# Patient Record
Sex: MALE | Race: ASIAN | NOT HISPANIC OR LATINO | Employment: FULL TIME | URBAN - METROPOLITAN AREA
[De-identification: names, ages, dates, MRNs, and addresses within clinical notes are randomized per-mention and may not be internally consistent; named-entity substitution may affect disease eponyms.]

---

## 2019-02-07 ENCOUNTER — OFFICE VISIT (OUTPATIENT)
Dept: PEDIATRICS | Facility: CLINIC | Age: 59
End: 2019-02-07
Payer: COMMERCIAL

## 2019-02-07 VITALS
WEIGHT: 159 LBS | TEMPERATURE: 98 F | HEART RATE: 74 BPM | SYSTOLIC BLOOD PRESSURE: 124 MMHG | OXYGEN SATURATION: 98 % | BODY MASS INDEX: 25.55 KG/M2 | DIASTOLIC BLOOD PRESSURE: 80 MMHG | HEIGHT: 66 IN

## 2019-02-07 DIAGNOSIS — Z11.3 ROUTINE SCREENING FOR STI (SEXUALLY TRANSMITTED INFECTION): ICD-10-CM

## 2019-02-07 DIAGNOSIS — Z00.00 ANNUAL PHYSICAL EXAM: Primary | ICD-10-CM

## 2019-02-07 DIAGNOSIS — E78.5 HYPERLIPIDEMIA LDL GOAL <100: ICD-10-CM

## 2019-02-07 DIAGNOSIS — M77.8 TENDONITIS OF ELBOW, RIGHT: ICD-10-CM

## 2019-02-07 LAB
ALBUMIN SERPL-MCNC: 4.5 G/DL (ref 3.4–5)
ALP SERPL-CCNC: 118 U/L (ref 40–150)
ALT SERPL W P-5'-P-CCNC: 37 U/L (ref 0–70)
ANION GAP SERPL CALCULATED.3IONS-SCNC: 7 MMOL/L (ref 3–14)
AST SERPL W P-5'-P-CCNC: 29 U/L (ref 0–45)
BILIRUB SERPL-MCNC: 0.8 MG/DL (ref 0.2–1.3)
BUN SERPL-MCNC: 10 MG/DL (ref 7–30)
CALCIUM SERPL-MCNC: 9.1 MG/DL (ref 8.5–10.1)
CHLORIDE SERPL-SCNC: 102 MMOL/L (ref 94–109)
CHOLEST SERPL-MCNC: 149 MG/DL
CO2 SERPL-SCNC: 26 MMOL/L (ref 20–32)
CREAT SERPL-MCNC: 0.76 MG/DL (ref 0.66–1.25)
GFR SERPL CREATININE-BSD FRML MDRD: >90 ML/MIN/{1.73_M2}
GLUCOSE SERPL-MCNC: 124 MG/DL (ref 70–99)
HDLC SERPL-MCNC: 67 MG/DL
LDLC SERPL CALC-MCNC: 70 MG/DL
NONHDLC SERPL-MCNC: 82 MG/DL
POTASSIUM SERPL-SCNC: 4.2 MMOL/L (ref 3.4–5.3)
PROT SERPL-MCNC: 8.1 G/DL (ref 6.8–8.8)
SODIUM SERPL-SCNC: 135 MMOL/L (ref 133–144)
TRIGL SERPL-MCNC: 61 MG/DL

## 2019-02-07 PROCEDURE — 87389 HIV-1 AG W/HIV-1&-2 AB AG IA: CPT | Performed by: PEDIATRICS

## 2019-02-07 PROCEDURE — 99213 OFFICE O/P EST LOW 20 MIN: CPT | Mod: 25 | Performed by: STUDENT IN AN ORGANIZED HEALTH CARE EDUCATION/TRAINING PROGRAM

## 2019-02-07 PROCEDURE — 87491 CHLMYD TRACH DNA AMP PROBE: CPT | Performed by: PEDIATRICS

## 2019-02-07 PROCEDURE — 0064U ANTB TP TOTAL&RPR IA QUAL: CPT | Performed by: PEDIATRICS

## 2019-02-07 PROCEDURE — 80061 LIPID PANEL: CPT | Performed by: PEDIATRICS

## 2019-02-07 PROCEDURE — 36415 COLL VENOUS BLD VENIPUNCTURE: CPT | Performed by: PEDIATRICS

## 2019-02-07 PROCEDURE — 99386 PREV VISIT NEW AGE 40-64: CPT | Mod: GC | Performed by: STUDENT IN AN ORGANIZED HEALTH CARE EDUCATION/TRAINING PROGRAM

## 2019-02-07 PROCEDURE — 87591 N.GONORRHOEAE DNA AMP PROB: CPT | Performed by: PEDIATRICS

## 2019-02-07 PROCEDURE — 80053 COMPREHEN METABOLIC PANEL: CPT | Performed by: PEDIATRICS

## 2019-02-07 RX ORDER — SIMVASTATIN 40 MG
40 TABLET ORAL
COMMUNITY
Start: 2018-08-20 | End: 2019-02-07

## 2019-02-07 RX ORDER — SIMVASTATIN 40 MG
40 TABLET ORAL AT BEDTIME
Qty: 30 TABLET | Refills: 11 | Status: SHIPPED | OUTPATIENT
Start: 2019-02-07 | End: 2020-02-28

## 2019-02-07 ASSESSMENT — MIFFLIN-ST. JEOR: SCORE: 1493.97

## 2019-02-07 NOTE — PATIENT INSTRUCTIONS
- will do labs today; call or mail you with the results  - please try ibuprofen 400 mg every 6 hours for next couple days; then transition to as needed  - get a elbow support brace from any pharmacy and use it for next 3-4 weeks; if not improving, please call clinic back for re-evaluation      Preventive Health Recommendations  Male Ages 50 - 64    Yearly exam:             See your health care provider every year in order to  o   Review health changes.   o   Discuss preventive care.    o   Review your medicines if your doctor has prescribed any.     Have a cholesterol test every 5 years, or more frequently if you are at risk for high cholesterol/heart disease.     Have a diabetes test (fasting glucose) every three years. If you are at risk for diabetes, you should have this test more often.     Have a colonoscopy at age 50, or have a yearly FIT test (stool test). These exams will check for colon cancer.      Talk with your health care provider about whether or not a prostate cancer screening test (PSA) is right for you.    You should be tested each year for STDs (sexually transmitted diseases), if you re at risk.     Shots: Get a flu shot each year. Get a tetanus shot every 10 years.     Nutrition:    Eat at least 5 servings of fruits and vegetables daily.     Eat whole-grain bread, whole-wheat pasta and brown rice instead of white grains and rice.     Get adequate Calcium and Vitamin D.     Lifestyle    Exercise for at least 150 minutes a week (30 minutes a day, 5 days a week). This will help you control your weight and prevent disease.     Limit alcohol to one drink per day.     No smoking.     Wear sunscreen to prevent skin cancer.     See your dentist every six months for an exam and cleaning.     See your eye doctor every 1 to 2 years.

## 2019-02-07 NOTE — PROGRESS NOTES
SUBJECTIVE:   CC: Walt Yin is an 58 year old male who presents for preventive health visit.     Healthy Habits:    Do you get at least three servings of calcium containing foods daily (dairy, green leafy vegetables, etc.)? yes    Amount of exercise or daily activities, outside of work: 30 minutes per day    Problems taking medications regularly No    Medication side effects: No    Have you had an eye exam in the past two years? yes    Do you see a dentist twice per year? yes    Do you have sleep apnea, excessive snoring or daytime drowsiness?yes  Colonoscopy done on this date: 2015 (approximately), results were hemorrhoids and recommended repeat in 2025.     Mr. Yin is a 57 yo M w/ hx of hyperlipidemia and prediabetes who presents to clinic for annual physical. Only concern today is that he has had right elbow pain for 1 year. He describes it as shock-type pain that radiates down the arm. Not associated with numbness or weakness. Still able to do all his regular activities without limitation. Has not noticed any swelling around the elbow and no injury to the area. He denies any repetitive movements of right elbow and is also left-handed. Not taking any medications for his pain. Otherwise healthy and had a colonoscopy done in 2015 with recommendation to repeat in 2025. He did not get a flu shot this year and does not want one. UTD on tetanus booster.      Today's PHQ-2 Score:   PHQ-2 ( 1999 Pfizer) 2/7/2019   Q1: Little interest or pleasure in doing things 0   Q2: Feeling down, depressed or hopeless 0   PHQ-2 Score 0       Abuse: Current or Past(Physical, Sexual or Emotional)- No  Do you feel safe in your environment? Yes    Social History     Tobacco Use     Smoking status: Never Smoker     Smokeless tobacco: Current User     Types: Chew   Substance Use Topics     Alcohol use: Yes     Comment: Social     If you drink alcohol do you typically have >3 drinks per day or >7 drinks per week? No                   "    Last PSA: No results found for: PSA    Reviewed orders with patient. Reviewed health maintenance and updated orders accordingly - Yes  There is no problem list on file for this patient.    History reviewed. No pertinent surgical history.    Social History     Tobacco Use     Smoking status: Never Smoker     Smokeless tobacco: Current User     Types: Chew   Substance Use Topics     Alcohol use: Yes     Comment: Social     History reviewed. No pertinent family history.      Current Outpatient Medications   Medication Sig Dispense Refill     aspirin (ASA) 81 MG tablet Take 81 mg by mouth       simvastatin (ZOCOR) 40 MG tablet Take 1 tablet (40 mg) by mouth At Bedtime 30 tablet 11     Not on File    Reviewed and updated as needed this visit by clinical staff  Tobacco  Allergies  Meds  Problems  Med Hx  Surg Hx  Fam Hx  Soc Hx          Reviewed and updated as needed this visit by Provider  Tobacco  Allergies  Meds  Problems  Med Hx  Surg Hx  Fam Hx            ROS:  CONSTITUTIONAL: NEGATIVE for fever, chills, change in weight  INTEGUMENTARY/SKIN: NEGATIVE for worrisome rashes, moles or lesions  EYES: NEGATIVE for vision changes or irritation  ENT: NEGATIVE for ear, mouth and throat problems  RESP: NEGATIVE for significant cough or SOB  CV: NEGATIVE for chest pain, palpitations or peripheral edema  GI: NEGATIVE for nausea, abdominal pain, heartburn, or change in bowel habits   male: POSITIVE for hernia  MUSCULOSKELETAL: POSITIVE for right elbow pain  NEURO: NEGATIVE for weakness, dizziness or paresthesias  PSYCHIATRIC: NEGATIVE for changes in mood or affect    OBJECTIVE:   /80 (BP Location: Right arm, Cuff Size: Adult Regular)   Pulse 74   Temp 98  F (36.7  C) (Oral)   Ht 1.676 m (5' 6\")   Wt 72.1 kg (159 lb)   SpO2 98%   BMI 25.66 kg/m       EXAM:  GENERAL: healthy, alert and no distress  EYES: Eyes grossly normal to inspection, PERRL and conjunctivae and sclerae normal, EOMI  HENT: ear " canals and TM's normal, nose and mouth without ulcers or lesions, MMM, multiple dental caries but no abscess or drainage noted  NECK: no adenopathy, no asymmetry, masses, or scars and thyroid normal to palpation  RESP: lungs clear to auscultation - no rales, rhonchi or wheezes  CV: regular rate and rhythm, normal S1 S2, no S3 or S4, no murmur, click or rub, no peripheral edema and peripheral pulses strong  ABDOMEN: soft, nontender, no hepatosplenomegaly, no masses and bowel sounds normal  : Right side direct inguinal hernia noted, no strangulation, reducible  MS: no gross musculoskeletal defects noted, no edema  SKIN: no suspicious lesions or rashes  NEURO: Alert, CN II-XII intact, normal tone and 5/5 strength in all extremities, 2+ patellar reflex bilaterally, mentation intact and speech normal  PSYCH: mentation appears normal, affect normal/bright    Diagnostic Test Results:  - CMP: pending  - lipid panel: pending  - GC/chlamydia: pending  - HIV: pending  - syphilis: pending    ASSESSMENT/PLAN:   1. Annual physical exam  Hx of hyperlipidemia on simvastatin and doing well. Last LDL was 88. His glucose has been intermittently in high range as well as HgbA1C but no formal diagnosis of diabetes in the past. Will check these with LFTs today  - Lipid panel reflex to direct LDL Fasting  - Comprehensive metabolic panel (BMP + Alb, Alk Phos, ALT, AST, Total. Bili, TP)    2. Routine screening for STI (sexually transmitted infection)  Sexually active with his wife. Has never been tested  - NEISSERIA GONORRHOEA PCR  - CHLAMYDIA TRACHOMATIS PCR  - HIV Antigen Antibody Combo  - Treponema Abs w Reflex to RPR and Titer    3. Tendonitis of elbow, right  Likely tendonitis of elbow. No trauma or injury suggestive of acute fracture. Epicondylitis less likely with no repetitive movement especially patient being left-handed. Symptoms are not consistent with carpal tunnel syndrome. Will try conservative management before doing further  "work-up  - ibuprofen 400 mg q6h for next couple days; transition to as needed afterwards  - obtain OTC elbow brace for support and use it for next 3-4 weeks    4. Hyperlipidemia LDL goal <100  Will refill statin today  - simvastatin (ZOCOR) 40 MG tablet; Take 1 tablet (40 mg) by mouth At Bedtime  Dispense: 30 tablet; Refill: 11    COUNSELING:  Reviewed preventive health counseling, as reflected in patient instructions       Regular exercise       Healthy diet/nutrition       Vision screening       Hearing screening       Immunizations    UTD       Aspirin Prophylaxsis       Safe sex practices/STD prevention       HIV screeninx in teen years, 1x in adult years, and at intervals if high risk       Colon cancer screening       Prostate cancer screening    BP Readings from Last 1 Encounters:   19 124/80     Estimated body mass index is 25.66 kg/m  as calculated from the following:    Height as of this encounter: 1.676 m (5' 6\").    Weight as of this encounter: 72.1 kg (159 lb).    BP Screening:   Last 3 BP Readings:    BP Readings from Last 3 Encounters:   19 124/80       The following was recommended to the patient:  Re-screen BP within a year and recommended lifestyle modifications       reports that  has never smoked. His smokeless tobacco use includes chew.  Tobacco Cessation Action Plan: Information offered: Patient not interested at this time    Counseling Resources:  ATP IV Guidelines  Pooled Cohorts Equation Calculator  FRAX Risk Assessment  ICSI Preventive Guidelines  Dietary Guidelines for Americans,   [employer] MyPlate  ASA Prophylaxis  Lung CA Screening    Patient seen and discussed with Dr. Jennifer Contreras MD  [facility]    Attestation:    I have seen patient and reviewed the documentation from Dr. Contreras and discussed the findings with Dr. Contreras. I agree with the documentation of Dr. Contreras.    Afsaneh Rodriguez MD  Internal Medicine/Pediatrics  [facility]  "

## 2019-02-07 NOTE — LETTER
[facility] ([facility]  [address]  [place] [address]                  [phone]   February 8, 2019    Walt Yin  [address]  [address]  [place] [address]      Dear Walt,    Here is a summary of your recent test results:    Your STD testing is negative.     Your cholesterol is well controlled.     Your metabolic panel (electrolytes, liver and kidney function) is normal, except it does show pre-diabetes. We will continue to screen for diabetes yearly.     Your test results are enclosed.      Please contact me if you have any questions.           Thank you very much for choosing [facility]    Best regards,    Afsaneh Rordiguez MD        Results for orders placed or performed in visit on 02/07/19   Lipid panel reflex to direct LDL Fasting   Result Value Ref Range    Cholesterol 149 <200 mg/dL    Triglycerides 61 <150 mg/dL    HDL Cholesterol 67 >39 mg/dL    LDL Cholesterol Calculated 70 <100 mg/dL    Non HDL Cholesterol 82 <130 mg/dL   HIV Antigen Antibody Combo   Result Value Ref Range    HIV Antigen Antibody Combo Nonreactive NR^Nonreactive       Treponema Abs w Reflex to RPR and Titer   Result Value Ref Range    Treponema Antibodies Nonreactive NR^Nonreactive   Comprehensive metabolic panel (BMP + Alb, Alk Phos, ALT, AST, Total. Bili, TP)   Result Value Ref Range    Sodium 135 133 - 144 mmol/L    Potassium 4.2 3.4 - 5.3 mmol/L    Chloride 102 94 - 109 mmol/L    Carbon Dioxide 26 20 - 32 mmol/L    Anion Gap 7 3 - 14 mmol/L    Glucose 124 (H) 70 - 99 mg/dL    Urea Nitrogen 10 7 - 30 mg/dL    Creatinine 0.76 0.66 - 1.25 mg/dL    GFR Estimate >90 >60 mL/min/[1.73_m2]    GFR Estimate If Black >90 >60 mL/min/[1.73_m2]    Calcium 9.1 8.5 - 10.1 mg/dL    Bilirubin Total 0.8 0.2 - 1.3 mg/dL    Albumin 4.5 3.4 - 5.0 g/dL    Protein Total 8.1 6.8 - 8.8 g/dL    Alkaline Phosphatase 118 40 - 150 U/L    ALT 37 0 - 70 U/L    AST 29 0 - 45 U/L   NEISSERIA GONORRHOEA PCR    Result Value Ref Range    Specimen Descrip Urine     N Gonorrhea PCR Negative NEG^Negative   CHLAMYDIA TRACHOMATIS PCR   Result Value Ref Range    Specimen Description Urine     Chlamydia Trachomatis PCR Negative NEG^Negative

## 2019-02-08 PROBLEM — R73.03 PREDIABETES: Status: ACTIVE | Noted: 2019-02-08

## 2019-02-08 PROBLEM — E78.5 HYPERLIPIDEMIA LDL GOAL <160: Status: ACTIVE | Noted: 2019-02-08

## 2019-02-08 LAB
C TRACH DNA SPEC QL NAA+PROBE: NEGATIVE
HIV 1+2 AB+HIV1 P24 AG SERPL QL IA: NONREACTIVE
N GONORRHOEA DNA SPEC QL NAA+PROBE: NEGATIVE
SPECIMEN SOURCE: NORMAL
SPECIMEN SOURCE: NORMAL
T PALLIDUM AB SER QL: NONREACTIVE

## 2020-03-12 ENCOUNTER — OFFICE VISIT (OUTPATIENT)
Dept: PEDIATRICS | Facility: CLINIC | Age: 60
End: 2020-03-12
Payer: COMMERCIAL

## 2020-03-12 VITALS
TEMPERATURE: 98.6 F | RESPIRATION RATE: 20 BRPM | SYSTOLIC BLOOD PRESSURE: 126 MMHG | DIASTOLIC BLOOD PRESSURE: 70 MMHG | OXYGEN SATURATION: 99 % | HEART RATE: 54 BPM | BODY MASS INDEX: 26.15 KG/M2 | HEIGHT: 64 IN | WEIGHT: 153.2 LBS

## 2020-03-12 DIAGNOSIS — J30.2 SEASONAL ALLERGIC RHINITIS, UNSPECIFIED TRIGGER: ICD-10-CM

## 2020-03-12 DIAGNOSIS — R06.83 SNORING: ICD-10-CM

## 2020-03-12 DIAGNOSIS — E78.5 HYPERLIPIDEMIA LDL GOAL <100: ICD-10-CM

## 2020-03-12 DIAGNOSIS — Z12.5 SCREENING FOR PROSTATE CANCER: ICD-10-CM

## 2020-03-12 DIAGNOSIS — Z00.00 ROUTINE GENERAL MEDICAL EXAMINATION AT A HEALTH CARE FACILITY: Primary | ICD-10-CM

## 2020-03-12 PROCEDURE — 99396 PREV VISIT EST AGE 40-64: CPT | Mod: GC | Performed by: STUDENT IN AN ORGANIZED HEALTH CARE EDUCATION/TRAINING PROGRAM

## 2020-03-12 RX ORDER — FLUTICASONE PROPIONATE 50 MCG
SPRAY, SUSPENSION (ML) NASAL
Qty: 16 G | Refills: 3 | Status: SHIPPED | OUTPATIENT
Start: 2020-03-12 | End: 2021-05-13

## 2020-03-12 RX ORDER — CETIRIZINE HYDROCHLORIDE 10 MG/1
10 TABLET ORAL DAILY
Qty: 90 TABLET | Refills: 3 | Status: SHIPPED | OUTPATIENT
Start: 2020-03-12 | End: 2021-03-25

## 2020-03-12 RX ORDER — CETIRIZINE HYDROCHLORIDE 10 MG/1
10 TABLET ORAL DAILY
COMMUNITY
End: 2020-03-12

## 2020-03-12 RX ORDER — FLUTICASONE PROPIONATE 50 MCG
SPRAY, SUSPENSION (ML) NASAL
COMMUNITY
Start: 2018-08-20 | End: 2020-03-12

## 2020-03-12 RX ORDER — SIMVASTATIN 40 MG
TABLET ORAL
Qty: 90 TABLET | Refills: 3 | Status: SHIPPED | OUTPATIENT
Start: 2020-03-12 | End: 2021-03-26

## 2020-03-12 ASSESSMENT — MIFFLIN-ST. JEOR: SCORE: 1428.04

## 2020-03-12 NOTE — PATIENT INSTRUCTIONS
- will check prostate specific antigen level, cholesterol, glucose, and ALT  - medications refilled  - sleep referral made; please make an appointment to get this done  - please get Shingrix vaccine from pharmacy; 2 dose series  - will call you with the lab results  - follow up in 1 year for annual physical      Preventive Health Recommendations  Male Ages 50 - 64    Yearly exam:             See your health care provider every year in order to  o   Review health changes.   o   Discuss preventive care.    o   Review your medicines if your doctor has prescribed any.     Have a cholesterol test every 5 years, or more frequently if you are at risk for high cholesterol/heart disease.     Have a diabetes test (fasting glucose) every three years. If you are at risk for diabetes, you should have this test more often.     Have a colonoscopy at age 50, or have a yearly FIT test (stool test). These exams will check for colon cancer.      Talk with your health care provider about whether or not a prostate cancer screening test (PSA) is right for you.    You should be tested each year for STDs (sexually transmitted diseases), if you re at risk.     Shots: Get a flu shot each year. Get a tetanus shot every 10 years.     Nutrition:    Eat at least 5 servings of fruits and vegetables daily.     Eat whole-grain bread, whole-wheat pasta and brown rice instead of white grains and rice.     Get adequate Calcium and Vitamin D.     Lifestyle    Exercise for at least 150 minutes a week (30 minutes a day, 5 days a week). This will help you control your weight and prevent disease.     Limit alcohol to one drink per day.     No smoking.     Wear sunscreen to prevent skin cancer.     See your dentist every six months for an exam and cleaning.     See your eye doctor every 1 to 2 years.

## 2020-03-12 NOTE — PROGRESS NOTES
SUBJECTIVE:   CC: Walt Yin is an 59 year old male who presents for preventative health visit.     Healthy Habits:     Getting at least 3 servings of Calcium per day:  Yes    Bi-annual eye exam:  Yes    Dental care twice a year:  Yes    Sleep apnea or symptoms of sleep apnea:  Sleep apnea and Excessive snoring    Diet:  Regular (no restrictions)    Frequency of exercise:  2-3 days/week    Duration of exercise:  30-45 minutes    Taking medications regularly:  Yes    Barriers to taking medications:  None    Medication side effects:  Not applicable    PHQ-2 Total Score: 0    Additional concerns today:  Yes (snoring and possible sleep apnea )      Other concerns: labs, possible sleep apnea and snoring     Mr. Yin is a 59-year-old inocente with history of hyperlipidemia and prediabetes who presents to clinic for annual physical.  Only main concern he has is his chronic snoring issue.  He has had this for many years.  He is also noticed that sometimes he stops breathing.  Would like to be tested for this.  Denies any headache in the morning.  Feels refreshed and not tired during the day.  Otherwise no new complaints.  He is up-to-date on immunization.  Does not smoke.  Drinks alcohol occasionally.  Does see a dentist and optometrist regularly.    Diet  - 2 times per day  - vegetarian  - drinks tea in the morning, no coffee  - no soda    Exercise:  - everyday 45 mins running on treadmill      Today's PHQ-2 Score:   PHQ-2 ( 1999 Pfizer) 3/12/2020   Q1: Little interest or pleasure in doing things 0   Q2: Feeling down, depressed or hopeless 0   PHQ-2 Score 0       Abuse: Current or Past(Physical, Sexual or Emotional)- No  Do you feel safe in your environment? Yes    Have you ever done Advance Care Planning? (For example, a Health Directive, POLST, or a discussion with a medical provider or your loved ones about your wishes): Yes, patient states has an Advance Care Planning document and will bring a copy to the  clinic.    Social History     Tobacco Use     Smoking status: Never Smoker     Smokeless tobacco: Current User     Types: Chew   Substance Use Topics     Alcohol use: Yes     Comment: Social     If you drink alcohol do you typically have >3 drinks per day or >7 drinks per week? No    Alcohol Use 3/12/2020   Prescreen: >3 drinks/day or >7 drinks/week? No       Last PSA: No results found for: PSA    Reviewed orders with patient. Reviewed health maintenance and updated orders accordingly - Yes  BP Readings from Last 3 Encounters:   03/12/20 126/70   02/07/19 124/80    Wt Readings from Last 3 Encounters:   03/12/20 69.5 kg (153 lb 3.2 oz)   02/07/19 72.1 kg (159 lb)                  Patient Active Problem List   Diagnosis     Prediabetes     Hyperlipidemia LDL goal <160     History reviewed. No pertinent surgical history.    Social History     Tobacco Use     Smoking status: Never Smoker     Smokeless tobacco: Current User     Types: Chew   Substance Use Topics     Alcohol use: Yes     Comment: Social     History reviewed. No pertinent family history.      Current Outpatient Medications   Medication Sig Dispense Refill     aspirin (ASA) 81 MG tablet Take 81 mg by mouth       cetirizine (ZYRTEC) 10 MG tablet Take 1 tablet (10 mg) by mouth daily 90 tablet 3     fluticasone (FLONASE) 50 MCG/ACT nasal spray USE 2 SPRAYS INTO EACH NOSTRIL DAILY 16 g 3     simvastatin (ZOCOR) 40 MG tablet TAKE ONE TABLET BY MOUTH EVERY NIGHT AT BEDTIME 90 tablet 3     No Known Allergies    Reviewed and updated as needed this visit by clinical staff  Tobacco  Allergies  Meds  Med Hx  Surg Hx  Fam Hx  Soc Hx        Reviewed and updated as needed this visit by Provider  Tobacco  Allergies  Meds  Med Hx  Surg Hx  Fam Hx  Soc Hx           Review of Systems  CONSTITUTIONAL: NEGATIVE for fever, chills  INTEGUMENTARY/SKIN: NEGATIVE for worrisome rashes  EYES: NEGATIVE for vision changes or irritation  ENT: NEGATIVE for ear, mouth and  "throat problems  RESP: NEGATIVE for significant cough or SOB  CV: NEGATIVE for chest pain, palpitations or peripheral edema  GI: NEGATIVE for nausea, abdominal pain, heartburn, or change in bowel habits  MUSCULOSKELETAL: NEGATIVE for significant arthralgias or myalgia  NEURO: NEGATIVE for weakness, dizziness or paresthesias  PSYCHIATRIC: NEGATIVE for changes in mood or affect    OBJECTIVE:   /70   Pulse 54   Temp 98.6  F (37  C) (Oral)   Resp 20   Ht 1.621 m (5' 3.82\")   Wt 69.5 kg (153 lb 3.2 oz)   SpO2 99%   BMI 26.45 kg/m      Physical Exam  GENERAL: healthy, alert and no distress  EYES: Eyes grossly normal to inspection, PERRL and conjunctivae and sclerae normal  HENT: ear canals normal, nose and mouth without ulcers or lesions  NECK: no adenopathy, no asymmetry, masses, or scars  RESP: lungs clear to auscultation - no rales, rhonchi or wheezes  CV: regular rate and rhythm, normal S1 S2, no S3 or S4, no murmur, click or rub, no peripheral edema and peripheral pulses strong  ABDOMEN: soft, nontender, no hepatosplenomegaly, no masses and bowel sounds normal  MS: no gross musculoskeletal defects noted, no edema  SKIN: no suspicious lesions or rashes  NEURO: Normal strength and tone, mentation intact and speech normal  PSYCH: mentation appears normal, affect normal/bright    Diagnostic Test Results:  - ALT: pending  - glucose: pending  - PSA: pending  - lipid panel: pending    ASSESSMENT/PLAN:   1. Routine general medical examination at a health care facility  Presenting for annual physical.  No new complaints other than chronic history of snoring.  Blood pressure is stable.  Exam is unremarkable.  He is up-to-date on immunizations including influenza.  However, does need Shingrix vaccine.  Discussed with patient that he can get this done at any pharmacy.  Patient planning to get this done at [facility] pharmacy.  Otherwise discussed prostate cancer screening and patient would like to pursue PSA " "check.  Colonoscopy is not needed at this time.  He had this done last in 2015 and was reportedly normal and repeat in 10 years.  We will check his cholesterol level, glucose, ALT today  - Lipid panel reflex to direct LDL Fasting; Future  - **Glucose FUTURE anytime; Future  - **ALT FUTURE anytime; Future    2. Hyperlipidemia LDL goal <100  See above  - Lipid panel reflex to direct LDL Fasting; Future  - simvastatin (ZOCOR) 40 MG tablet; TAKE ONE TABLET BY MOUTH EVERY NIGHT AT BEDTIME  Dispense: 90 tablet; Refill: 3  - **ALT FUTURE anytime; Future    3. Screening for prostate cancer  See above  - **Prostate spec antigen screen FUTURE anytime; Future    4. Seasonal allergic rhinitis, unspecified trigger  Refilled medications  - fluticasone (FLONASE) 50 MCG/ACT nasal spray; USE 2 SPRAYS INTO EACH NOSTRIL DAILY  Dispense: 16 g; Refill: 3  - cetirizine (ZYRTEC) 10 MG tablet; Take 1 tablet (10 mg) by mouth daily  Dispense: 90 tablet; Refill: 3    5. Snoring  Complaining of chronic history of snoring.  No headaches in the morning.  No daytime sleepiness.  However patient is concerned and would like to be tested for obstructive sleep apnea.  Referral made.  Informed patient that [facility] will call the patient in the next day or so  - SLEEP EVALUATION & MANAGEMENT REFERRAL - ADULT -[facility]  [phone] (Age 18 and up); Future    COUNSELING:   Reviewed preventive health counseling, as reflected in patient instructions       Regular exercise       Healthy diet/nutrition       Vision screening       Immunizations       Prostate cancer screening    Estimated body mass index is 26.45 kg/m  as calculated from the following:    Height as of this encounter: 1.621 m (5' 3.82\").    Weight as of this encounter: 69.5 kg (153 lb 3.2 oz).          reports that he has never smoked. His smokeless tobacco use includes chew.      Counseling Resources:  ATP IV Guidelines  Pooled Cohorts Equation " Calculator  FRAX Risk Assessment  ICSI Preventive Guidelines  Dietary Guidelines for Americans, 2010  USDA's MyPlate  ASA Prophylaxis  Lung CA Screening    Patient seen and discussed [facility] Dr[facility]  [facility]    I have seen and discussed the patient with Dr. Contreras and agree with the jointly developed plan as documented above.    Talia Guevara MD   Internal Medicine-Pediatrics

## 2020-03-14 DIAGNOSIS — Z00.00 ROUTINE GENERAL MEDICAL EXAMINATION AT A HEALTH CARE FACILITY: ICD-10-CM

## 2020-03-14 DIAGNOSIS — E78.5 HYPERLIPIDEMIA LDL GOAL <100: ICD-10-CM

## 2020-03-14 DIAGNOSIS — Z12.5 SCREENING FOR PROSTATE CANCER: ICD-10-CM

## 2020-03-14 LAB
ALT SERPL W P-5'-P-CCNC: 34 U/L (ref 0–70)
CHOLEST SERPL-MCNC: 163 MG/DL
GLUCOSE SERPL-MCNC: 118 MG/DL (ref 70–99)
HDLC SERPL-MCNC: 72 MG/DL
LDLC SERPL CALC-MCNC: 77 MG/DL
NONHDLC SERPL-MCNC: 91 MG/DL
PSA SERPL-ACNC: 0.4 UG/L (ref 0–4)
TRIGL SERPL-MCNC: 71 MG/DL

## 2020-03-14 PROCEDURE — 36415 COLL VENOUS BLD VENIPUNCTURE: CPT | Performed by: INTERNAL MEDICINE

## 2020-03-14 PROCEDURE — 80061 LIPID PANEL: CPT | Performed by: INTERNAL MEDICINE

## 2020-03-14 PROCEDURE — 82947 ASSAY GLUCOSE BLOOD QUANT: CPT | Performed by: INTERNAL MEDICINE

## 2020-03-14 PROCEDURE — 84460 ALANINE AMINO (ALT) (SGPT): CPT | Performed by: INTERNAL MEDICINE

## 2020-03-14 PROCEDURE — G0103 PSA SCREENING: HCPCS | Performed by: INTERNAL MEDICINE

## 2020-06-20 ENCOUNTER — APPOINTMENT (OUTPATIENT)
Dept: CT IMAGING | Facility: CLINIC | Age: 60
End: 2020-06-20
Attending: EMERGENCY MEDICINE
Payer: COMMERCIAL

## 2020-06-20 ENCOUNTER — HOSPITAL ENCOUNTER (EMERGENCY)
Facility: CLINIC | Age: 60
Discharge: HOME | End: 2020-06-20
Attending: EMERGENCY MEDICINE | Admitting: EMERGENCY MEDICINE
Payer: COMMERCIAL

## 2020-06-20 VITALS
RESPIRATION RATE: 16 BRPM | OXYGEN SATURATION: 99 % | SYSTOLIC BLOOD PRESSURE: 128 MMHG | HEART RATE: 88 BPM | DIASTOLIC BLOOD PRESSURE: 76 MMHG | TEMPERATURE: 97.8 F

## 2020-06-20 DIAGNOSIS — T07.XXXA ABRASIONS OF MULTIPLE SITES: ICD-10-CM
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
## 2020-06-20 DIAGNOSIS — S09.90XA CLOSED HEAD INJURY, INITIAL ENCOUNTER: ICD-10-CM

## 2020-06-20 PROCEDURE — 99284 EMERGENCY DEPT VISIT MOD MDM: CPT | Mod: 25

## 2020-06-20 PROCEDURE — 70450 CT HEAD/BRAIN W/O DYE: CPT

## 2020-06-20 ASSESSMENT — ENCOUNTER SYMPTOMS
SHORTNESS OF BREATH: 0
NECK PAIN: 0
ABDOMINAL PAIN: 0
DIZZINESS: 0
WOUND: 1
NUMBNESS: 0
HEADACHES: 0

## 2020-06-20 NOTE — ED AVS SNAPSHOT
[facility] Emergency Department  [address]  [place] [place] [address]  Phone:  [phone]  Fax:  [phone]                                    Walt Yin   MRN: [id number]    Department:  [facility] Emergency Department   Date of Visit:  6/20/2020           After Visit Summary Signature Page    I have received my discharge instructions, and my questions have been answered. I have discussed any challenges I see with this plan with the nurse or doctor.    ..........................................................................................................................................  Patient/Patient Representative Signature      ..........................................................................................................................................  Patient Representative Print Name and Relationship to Patient    ..................................................               ................................................  Date                                   Time    ..........................................................................................................................................  Reviewed by Signature/Title    ...................................................              ..............................................  Date                                               Time          22EPIC Rev 08/18
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
## 2020-06-23 ENCOUNTER — VIRTUAL VISIT (OUTPATIENT)
Dept: PEDIATRICS | Facility: CLINIC | Age: 60
End: 2020-06-23
Payer: COMMERCIAL
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
## 2020-06-23 DIAGNOSIS — H11.31 SUBCONJUNCTIVAL HEMORRHAGE OF RIGHT EYE: ICD-10-CM

## 2020-06-23 DIAGNOSIS — R68.84 JAW PAIN: ICD-10-CM

## 2020-06-23 DIAGNOSIS — R42 DIZZINESS: ICD-10-CM

## 2020-06-23 DIAGNOSIS — T07.XXXA ABRASIONS OF MULTIPLE SITES: ICD-10-CM

## 2020-06-23 PROCEDURE — 99214 OFFICE O/P EST MOD 30 MIN: CPT | Mod: 95 | Performed by: NURSE PRACTITIONER

## 2020-06-23 NOTE — PATIENT INSTRUCTIONS
The most important thing is for complete physical and mental rest after a concussion.   The severity of the concussion does not predict length of time for recovery.     To help with your recovery:  1. Healthy eating, good hydration- increase water intake by 32-64 oz daily  2. Adequate sleep- 8-10 hours per night  3. Use Acetaminophen for headaches; sometimes Ibuprofen is ok   4. If nausea, an anit-nausea medication can be prescribed  5. No driving until symptoms cleared  6. Reduce stressors    Physical rest for 1-2 days after injury, then on days 2-3, a gradual resumption of normal activity  Brain rest. If headaches or visual disturbance, no electronics, reading nor homework for 1-2 days. Once these symptoms are better start adding back reading, homework and then followed by electronics.   If light sensitivity, wear sunglasses or hat with brim.     Return to School: .   * If able to concentrate and do light mental activites for 30 minutes before any concussion- type symptoms arise, can  Start going back to school   * You may need to take breaks, attend shorter days and build up as tolerated, loud classes like band and lunch may need to be avoided, no PE until #3 below.   * Should not return to athletics until completely back to academics    Return to Play/ Activity. Follow stepwise symptom program. There should be 24 hours between each step and if symptoms recur, then need to start back at stage 1.   1. Physical and mental rest until no symptoms  2. Light aerobic exercise (e.g. Stationary bike, walk around block)  3. Sport- specific exercise  4. Non-contact training drills (can start light resistance training)  5. Full contact training drills (after medical clearance)  6. Return to competition (game)    Watch for:  * Headaches that are getting worse  * Very drowsy and can't be awakened  * Can't recognize people or places  * Have repeated vomiting  * Behave unusually, seem confused or very irritable  * Have  seizures  * Have weak arms or legs  * Unsteady on your feet, slurred speech
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
## 2020-06-25 ENCOUNTER — TELEPHONE (OUTPATIENT)
Dept: PEDIATRICS | Facility: CLINIC | Age: 60
End: 2020-06-25

## 2020-06-25 NOTE — TELEPHONE ENCOUNTER
The pt is aware and scheduled for his upcoming appointment.  Henny Hoskins on 6/25/2020 at 3:14 PM

## 2020-06-25 NOTE — TELEPHONE ENCOUNTER
Pt had phone visit with me on 6/23 for ED follow-up with multiple issues discussed.  Plan was to follow-up Friday for F2F visit if symptoms did not improve so appointment was scheduled just in case with Kristel but Kristel is home virtual, so this won't work.    Can you call pt and see how symptoms are doing? (dizziness, jaw pain, eye). If symptoms are persisting but not worsening could schedule F2F next week (looks like no other openings tomorrow for F2F). If improving, could cancel visit. If worsening, may need to offer UC to address concerns.    Thanks!    Bernie Ricardo, APRN, CNP

## 2020-06-25 NOTE — TELEPHONE ENCOUNTER
MA/TC- Per Bernie Ricardo NP- Patient needs f2f visit next week. Please assist in scheduling. Thank you!. Ml Johnson RN on 6/25/2020 at 10:24 AM      Spoke to patient. He is still having dizziness when he changes positions. He is also still having jaw and eye pain. I let him know that he does not have a face to face visit tomorrow and that would be more appropriate to meet his needs. Since he is not worse, he could do a face to face visit next week. Advised I will have TC call to schedule for next week. He should let us know if he needs anything more immediuate and he agrees to this. Ml Johnson RN on 6/25/2020 at 10:23 AM

## 2020-11-02 ENCOUNTER — APPOINTMENT (OUTPATIENT)
Dept: OPTOMETRY | Facility: CLINIC | Age: 60
End: 2020-11-02
Payer: COMMERCIAL

## 2020-11-02 ENCOUNTER — OFFICE VISIT (OUTPATIENT)
Dept: OPTOMETRY | Facility: CLINIC | Age: 60
End: 2020-11-02
Payer: COMMERCIAL

## 2020-11-02 DIAGNOSIS — H52.203 MYOPIA OF BOTH EYES WITH ASTIGMATISM: Primary | ICD-10-CM

## 2020-11-02 DIAGNOSIS — H52.4 PRESBYOPIA: ICD-10-CM

## 2020-11-02 DIAGNOSIS — H52.13 MYOPIA OF BOTH EYES WITH ASTIGMATISM: Primary | ICD-10-CM

## 2020-11-02 PROCEDURE — 92015 DETERMINE REFRACTIVE STATE: CPT | Performed by: OPTOMETRIST

## 2020-11-02 PROCEDURE — V2020 VISION SVCS FRAMES PURCHASES: HCPCS | Performed by: OPTOMETRIST

## 2020-11-02 PROCEDURE — 92004 COMPRE OPH EXAM NEW PT 1/>: CPT | Performed by: OPTOMETRIST

## 2020-11-02 PROCEDURE — V2103 SPHEROCYLINDR 4.00D/12-2.00D: HCPCS | Mod: LT | Performed by: OPTOMETRIST

## 2020-11-02 ASSESSMENT — VISUAL ACUITY
OS_CC: 20/20
OD_CC: 20/25
METHOD: SNELLEN - LINEAR
OS_CC+: -2
OS_SC: 20/20-1
CORRECTION_TYPE: GLASSES
OD_SC+: -1
OD_SC: 20/70
OD_CC+: +2
OS_SC+: -1
OS_SC: 20/40
OD_SC: 20/40

## 2020-11-02 ASSESSMENT — REFRACTION_MANIFEST
METHOD_AUTOREFRACTION: 1
OD_CYLINDER: +0.50
OS_CYLINDER: +0.25
OD_CYLINDER: +0.25
OD_ADD: +2.25
OD_AXIS: 112
OS_SPHERE: -2.75
OS_ADD: +2.25
OS_SPHERE: -2.00
OS_AXIS: 158
OD_SPHERE: -2.00
OD_SPHERE: -2.00
OD_AXIS: 115

## 2020-11-02 ASSESSMENT — EXTERNAL EXAM - RIGHT EYE: OD_EXAM: NORMAL

## 2020-11-02 ASSESSMENT — EXTERNAL EXAM - LEFT EYE: OS_EXAM: NORMAL

## 2020-11-02 ASSESSMENT — REFRACTION_WEARINGRX
OD_CYLINDER: SPHERE
OS_CYLINDER: +0.75
SPECS_TYPE: SVL
OD_SPHERE: -1.75
OS_AXIS: 090
OS_SPHERE: -2.50

## 2020-11-02 ASSESSMENT — CONF VISUAL FIELD
OS_NORMAL: 1
OD_NORMAL: 1
METHOD: COUNTING FINGERS

## 2020-11-02 ASSESSMENT — SLIT LAMP EXAM - LIDS
COMMENTS: NORMAL
COMMENTS: NORMAL

## 2020-11-02 ASSESSMENT — TONOMETRY
OS_IOP_MMHG: 18
OD_IOP_MMHG: 18
IOP_METHOD: APPLANATION

## 2020-11-02 ASSESSMENT — CUP TO DISC RATIO
OD_RATIO: 0.55
OS_RATIO: 0.5

## 2020-11-02 NOTE — PROGRESS NOTES
Chief Complaint   Patient presents with     Annual Eye Exam      Broken glasses  Using for DVA, NVA is ok without correction.  Does not want a bifocal prescription     Last Eye Exam: 2019  Dilated Previously: No, side effects explained.     What are you currently using to see?  glasses       Distance Vision Acuity: Satisfied with vision    Near Vision Acuity: Satisfied with vision while reading and using computer unaided    Eye Comfort: good  Do you use eye drops? : No  Occupation or Hobbies: Works for a [employer]      MR>habitual     Medical, surgical and family histories reviewed and updated 11/2/2020.       OBJECTIVE: See Ophthalmology exam    ASSESSMENT:    ICD-10-CM    1. Myopia of both eyes with astigmatism  H52.13     H52.203    2. Presbyopia  H52.4       PLAN:   Update single vision distance prescription     Bernie Castaneda OD

## 2020-11-02 NOTE — LETTER
11/2/2020         RE: Walt Yin  [address]  [address]  [place] [address]        Dear Colleague,    Thank you for referring your patient, Walt Yin, to the [facility]. Please see a copy of my visit note below.    Chief Complaint   Patient presents with     Annual Eye Exam      Broken glasses  Using for DVA, NVA is ok without correction.  Does not want a bifocal prescription     Last Eye Exam: 2019  Dilated Previously: No, side effects explained.     What are you currently using to see?  glasses       Distance Vision Acuity: Satisfied with vision    Near Vision Acuity: Satisfied with vision while reading and using computer unaided    Eye Comfort: good  Do you use eye drops? : No  Occupation or Hobbies: Works for a [employer]      MR>habitual     Medical, surgical and family histories reviewed and updated 11/2/2020.       OBJECTIVE: See Ophthalmology exam    ASSESSMENT:    ICD-10-CM    1. Myopia of both eyes with astigmatism  H52.13     H52.203    2. Presbyopia  H52.4       PLAN:   Update single vision distance prescription     Bernie Castaneda OD       Again, thank you for allowing me to participate in the care of your patient.        Sincerely,        Bernie Castaneda, OD

## 2020-11-10 ENCOUNTER — APPOINTMENT (OUTPATIENT)
Dept: OPTOMETRY | Facility: CLINIC | Age: 60
End: 2020-11-10
Payer: COMMERCIAL

## 2020-11-10 PROCEDURE — 92340 FIT SPECTACLES MONOFOCAL: CPT | Performed by: OPTOMETRIST

## 2020-11-12 ENCOUNTER — ALLIED HEALTH/NURSE VISIT (OUTPATIENT)
Dept: PEDIATRICS | Facility: CLINIC | Age: 60
End: 2020-11-12
Payer: COMMERCIAL

## 2020-11-12 VITALS — SYSTOLIC BLOOD PRESSURE: 136 MMHG | DIASTOLIC BLOOD PRESSURE: 72 MMHG

## 2020-11-12 DIAGNOSIS — Z01.30 BP CHECK: Primary | ICD-10-CM

## 2020-11-12 PROCEDURE — 99207 PR NO CHARGE NURSE ONLY: CPT | Performed by: STUDENT IN AN ORGANIZED HEALTH CARE EDUCATION/TRAINING PROGRAM

## 2020-11-12 NOTE — PROGRESS NOTES
Walt Yin was evaluated at [facility] on November 12, 2020 at which time his blood pressure was:    BP Readings from Last 3 Encounters:   11/12/20 136/72   06/20/20 128/76   03/12/20 126/70     Pulse Readings from Last 3 Encounters:   06/20/20 88   03/12/20 54   02/07/19 74       Reviewed lifestyle modifications for blood pressure control and reduction: including making healthy food choices, managing weight, getting regular exercise, smoking cessation, reducing alcohol consumption, monitoring blood pressure regularly.     Symptoms: None    BP Goal:< 140/90 mmHg    BP Assessment:  BP at goal    Potential Reasons for BP too high: NA - Not applicable    BP Follow-Up Plan: Recheck BP in 6 months at pharmacy    Recommendation to Provider: n/a    Note completed by:     Dionicio King, PharmD  [facility] [place]

## 2021-03-23 ENCOUNTER — OFFICE VISIT (OUTPATIENT)
Dept: URGENT CARE | Facility: URGENT CARE | Age: 61
End: 2021-03-23
Payer: COMMERCIAL

## 2021-03-23 VITALS
OXYGEN SATURATION: 98 % | RESPIRATION RATE: 20 BRPM | SYSTOLIC BLOOD PRESSURE: 140 MMHG | DIASTOLIC BLOOD PRESSURE: 78 MMHG | HEART RATE: 78 BPM | TEMPERATURE: 98 F

## 2021-03-23 DIAGNOSIS — M54.6 ACUTE LEFT-SIDED THORACIC BACK PAIN: Primary | ICD-10-CM

## 2021-03-23 DIAGNOSIS — R07.9 ACUTE CHEST PAIN: ICD-10-CM

## 2021-03-23 PROCEDURE — 99214 OFFICE O/P EST MOD 30 MIN: CPT | Performed by: PHYSICIAN ASSISTANT

## 2021-03-23 PROCEDURE — 93000 ELECTROCARDIOGRAM COMPLETE: CPT | Performed by: PHYSICIAN ASSISTANT

## 2021-03-23 RX ORDER — CYCLOBENZAPRINE HCL 10 MG
10 TABLET ORAL AT BEDTIME
Qty: 30 TABLET | Refills: 0 | Status: SHIPPED | OUTPATIENT
Start: 2021-03-23 | End: 2021-04-02

## 2021-03-23 NOTE — PATIENT INSTRUCTIONS
Follow up right away with new or worsening symptoms    Follow up with your regular doctor if not improving with ice and rest    Patient Education     Relieving Back Pain  Back pain is a common problem. You can strain back muscles by lifting too much weight or just by moving the wrong way. Back strain can be uncomfortable, even painful. And it can take weeks or months to improve. To help yourself feel better and prevent future back strains, try these tips.  Important: Don't give aspirin to children or teens without first discussing it with your child's healthcare provider.  Ice    Ice reduces muscle pain and swelling. It helps most during the first 24 to 48 hours after an injury.    Wrap an ice pack or a bag of frozen peas in a thin towel. Never put ice directly on your skin.    Place the ice where your back hurts the most.    Don t ice for more than 20 minutes at a time.    You can use ice several times a day.  Medicines  Over-the-counter pain relievers include acetaminophen and anti-inflammatory medicines, which includes aspirin, naproxen, or ibuprofen. They can help ease discomfort. Some also reduce swelling.    Tell your healthcare provider about any medicines you are already taking.    Take medicines only as directed.  Manipulation and massage  Having manipulation by an osteopathic doctor or chiropractor may be helpful. Getting a massage also may help.   Heat  After the first 48 hours, heat can relax sore muscles and improve blood flow.    Try a warm bath or shower. Or use a heating pad set on low. To prevent a burn, keep a cloth between you and the heating pad.    Don t use a heating pad for more than 15 minutes at a time. Never sleep on a heating pad.  [email or web address] last reviewed this educational content on 6/1/2018 2000-2020 [employer]. All rights reserved. This information is not intended as a substitute for professional medical care. Always follow your healthcare professional's  instructions.

## 2021-03-24 NOTE — PROGRESS NOTES
SUBJECTIVE:  Chief Complaint   Patient presents with     Urgent Care     Chest Pain     chest pain/L shoulder pain      Walt Yin is a 60 year old male presents with a chief complaint of left shoulder pain.  The injury occurred 1 week(s) ago.   Unclear event.  The patient complained of mild pain  and has not had decreased ROM.  Pain exacerbated by weight-bearing and movement.  Relieved by rest.  He treated it initially with no therapy. This is the first time this type of injury has occurred to this patient.     No GI Symptoms, shortness of breath.      Some pain in anterior lower ribs with movement.      No arm pain.    History reviewed. No pertinent past medical history.  Current Outpatient Medications   Medication Sig Dispense Refill     aspirin (ASA) 81 MG tablet Take 81 mg by mouth       cetirizine (ZYRTEC) 10 MG tablet Take 1 tablet (10 mg) by mouth daily 90 tablet 3     cyclobenzaprine (FLEXERIL) 10 MG tablet Take 1 tablet (10 mg) by mouth At Bedtime for 10 days 30 tablet 0     fluticasone (FLONASE) 50 MCG/ACT nasal spray USE 2 SPRAYS INTO EACH NOSTRIL DAILY 16 g 3     simvastatin (ZOCOR) 40 MG tablet TAKE ONE TABLET BY MOUTH EVERY NIGHT AT BEDTIME 90 tablet 3     Social History     Tobacco Use     Smoking status: Never Smoker     Smokeless tobacco: Current User     Types: Chew   Substance Use Topics     Alcohol use: Yes     Comment: Social       ROS:  10 point ROS negative except as listed above      EXAM:   BP (!) 140/78   Pulse 78   Temp 98  F (36.7  C)   Resp 20   SpO2 98%   Gen: healthy,alert,no distress  Extremity: shoulder has point tenderness at rhomboid.   There is not compromise to the distal circulation.  Pulses are +2 and CRT is brisk  GENERAL APPEARANCE: healthy, alert and no distress  CHEST: clear to auscultation  CV: regular rate and rhythm  EXTREMITIES: peripheral pulses normal  MS: no gross deformities noted, no evidence of inflammation in joints, FROM in all extremities.  SKIN: no  suspicious lesions or rashes  NEURO: Normal strength and tone, sensory exam grossly normal, mentation intact and speech normal    EKG: NSR    ASSESSMENT:   (M54.6) Acute left-sided thoracic back pain  (primary encounter diagnosis)  Comment: ms in origin  Plan: cyclobenzaprine (FLEXERIL) 10 MG tablet        Rest, ice    (R07.9) Acute chest pain  Comment: no evidence of cardiac etiology.  Likely MS  Plan: EKG 12-lead complete w/read - Clinics            Red flags and emergent follow up discussed, and understood by patient  Follow up with PCP if symptoms worsen or fail to improve      Patient Instructions   Follow up right away with new or worsening symptoms    Follow up with your regular doctor if not improving with ice and rest    Patient Education     Relieving Back Pain  Back pain is a common problem. You can strain back muscles by lifting too much weight or just by moving the wrong way. Back strain can be uncomfortable, even painful. And it can take weeks or months to improve. To help yourself feel better and prevent future back strains, try these tips.  Important: Don't give aspirin to children or teens without first discussing it with your child's healthcare provider.  Ice    Ice reduces muscle pain and swelling. It helps most during the first 24 to 48 hours after an injury.    Wrap an ice pack or a bag of frozen peas in a thin towel. Never put ice directly on your skin.    Place the ice where your back hurts the most.    Don t ice for more than 20 minutes at a time.    You can use ice several times a day.  Medicines  Over-the-counter pain relievers include acetaminophen and anti-inflammatory medicines, which includes aspirin, naproxen, or ibuprofen. They can help ease discomfort. Some also reduce swelling.    Tell your healthcare provider about any medicines you are already taking.    Take medicines only as directed.  Manipulation and massage  Having manipulation by an osteopathic doctor or chiropractor may be  helpful. Getting a massage also may help.   Heat  After the first 48 hours, heat can relax sore muscles and improve blood flow.    Try a warm bath or shower. Or use a heating pad set on low. To prevent a burn, keep a cloth between you and the heating pad.    Don t use a heating pad for more than 15 minutes at a time. Never sleep on a heating pad.  [employer] last reviewed this educational content on 6/1/2018 2000-2020 [employer]. All rights reserved. This information is not intended as a substitute for professional medical care. Always follow your healthcare professional's instructions.

## 2021-03-25 DIAGNOSIS — J30.2 SEASONAL ALLERGIC RHINITIS, UNSPECIFIED TRIGGER: ICD-10-CM

## 2021-03-25 DIAGNOSIS — E78.5 HYPERLIPIDEMIA LDL GOAL <100: ICD-10-CM

## 2021-03-25 RX ORDER — CETIRIZINE HYDROCHLORIDE 10 MG/1
10 TABLET ORAL DAILY
Qty: 90 TABLET | Refills: 2 | Status: SHIPPED | OUTPATIENT
Start: 2021-03-25 | End: 2021-04-21

## 2021-03-25 NOTE — TELEPHONE ENCOUNTER
Routing refill request to provider for review/approval because:  Labs not current:    LDL Cholesterol Calculated   Date Value Ref Range Status   03/14/2020 77 <100 mg/dL Final     Comment:     Desirable:       <100 mg/dl     Alyssa Hdez RN

## 2021-03-26 RX ORDER — SIMVASTATIN 40 MG
TABLET ORAL
Qty: 30 TABLET | Refills: 0 | Status: SHIPPED | OUTPATIENT
Start: 2021-03-26 | End: 2021-04-21

## 2021-04-21 ENCOUNTER — OFFICE VISIT (OUTPATIENT)
Dept: PEDIATRICS | Facility: CLINIC | Age: 61
End: 2021-04-21
Payer: COMMERCIAL

## 2021-04-21 VITALS
WEIGHT: 141.4 LBS | HEIGHT: 64 IN | RESPIRATION RATE: 18 BRPM | BODY MASS INDEX: 24.14 KG/M2 | SYSTOLIC BLOOD PRESSURE: 100 MMHG | TEMPERATURE: 98.9 F | HEART RATE: 68 BPM | OXYGEN SATURATION: 99 % | DIASTOLIC BLOOD PRESSURE: 60 MMHG

## 2021-04-21 DIAGNOSIS — R73.03 PREDIABETES: ICD-10-CM

## 2021-04-21 DIAGNOSIS — H10.13 ALLERGIC CONJUNCTIVITIS, BILATERAL: ICD-10-CM

## 2021-04-21 DIAGNOSIS — Z00.00 ROUTINE GENERAL MEDICAL EXAMINATION AT A HEALTH CARE FACILITY: Primary | ICD-10-CM

## 2021-04-21 DIAGNOSIS — G47.30 SLEEP APNEA, UNSPECIFIED TYPE: ICD-10-CM

## 2021-04-21 DIAGNOSIS — J30.2 SEASONAL ALLERGIC RHINITIS, UNSPECIFIED TRIGGER: ICD-10-CM

## 2021-04-21 DIAGNOSIS — Z11.59 NEED FOR HEPATITIS C SCREENING TEST: ICD-10-CM

## 2021-04-21 DIAGNOSIS — E78.5 HYPERLIPIDEMIA LDL GOAL <160: ICD-10-CM

## 2021-04-21 LAB — HBA1C MFR BLD: 6 % (ref 0–5.6)

## 2021-04-21 PROCEDURE — 83036 HEMOGLOBIN GLYCOSYLATED A1C: CPT | Performed by: INTERNAL MEDICINE

## 2021-04-21 PROCEDURE — 80053 COMPREHEN METABOLIC PANEL: CPT | Performed by: INTERNAL MEDICINE

## 2021-04-21 PROCEDURE — 99396 PREV VISIT EST AGE 40-64: CPT | Performed by: INTERNAL MEDICINE

## 2021-04-21 PROCEDURE — 86803 HEPATITIS C AB TEST: CPT | Performed by: INTERNAL MEDICINE

## 2021-04-21 PROCEDURE — 36415 COLL VENOUS BLD VENIPUNCTURE: CPT | Performed by: INTERNAL MEDICINE

## 2021-04-21 PROCEDURE — 80061 LIPID PANEL: CPT | Performed by: INTERNAL MEDICINE

## 2021-04-21 RX ORDER — CETIRIZINE HYDROCHLORIDE 10 MG/1
10 TABLET ORAL DAILY PRN
Qty: 90 TABLET | Refills: 1 | Status: SHIPPED | OUTPATIENT
Start: 2021-04-21 | End: 2023-09-20

## 2021-04-21 RX ORDER — SIMVASTATIN 40 MG
TABLET ORAL
Qty: 90 TABLET | Refills: 3 | Status: SHIPPED | OUTPATIENT
Start: 2021-04-21 | End: 2022-06-08

## 2021-04-21 RX ORDER — OLOPATADINE HYDROCHLORIDE 1 MG/ML
1 SOLUTION/ DROPS OPHTHALMIC 2 TIMES DAILY
Qty: 5 ML | Refills: 3 | Status: SHIPPED | OUTPATIENT
Start: 2021-04-21 | End: 2021-05-13

## 2021-04-21 ASSESSMENT — MIFFLIN-ST. JEOR: SCORE: 1372.39

## 2021-04-21 NOTE — PROGRESS NOTES
SUBJECTIVE:   CC: Walt Yin is an 60 year old male who presents for preventative health visit.   Patient has been advised of split billing requirements and indicates understanding: Yes     Healthy Habits:    Getting at least 3 servings of Calcium per day:  Yes    Bi-annual eye exam:  Yes    Dental care twice a year:  Yes    Sleep apnea or symptoms of sleep apnea:  None    Diet:  Regular (no restrictions)    Frequency of exercise:  6-7 days/week    Duration of exercise:  30-45 minutes    Taking medications regularly:  Yes    Barriers to taking medications:  None    Medication side effects:  None    PHQ-2 Total Score:    Additional concerns today:  No      Today's PHQ-2 Score:   PHQ-2 ( 1999 Pfizer) 3/12/2020   Q1: Little interest or pleasure in doing things 0   Q2: Feeling down, depressed or hopeless 0   PHQ-2 Score 0       Abuse: Current or Past(Physical, Sexual or Emotional)- No  Do you feel safe in your environment? Yes    Social History     Tobacco Use     Smoking status: Never Smoker     Smokeless tobacco: Current User     Types: Chew   Substance Use Topics     Alcohol use: Yes     Comment: Social         Alcohol Use 3/12/2020   Prescreen: >3 drinks/day or >7 drinks/week? No       Last PSA:   PSA   Date Value Ref Range Status   03/14/2020 0.40 0 - 4 ug/L Final     Comment:     Assay Method:  Chemiluminescence using Siemens Vista analyzer       Reviewed orders with patient. Reviewed health maintenance and updated orders accordingly - Yes  Patient Active Problem List   Diagnosis     Prediabetes     Hyperlipidemia LDL goal <160     No past surgical history on file.    Social History     Tobacco Use     Smoking status: Never Smoker     Smokeless tobacco: Current User     Types: Chew   Substance Use Topics     Alcohol use: Yes     Comment: Social     No family history on file.      Current Outpatient Medications   Medication Sig Dispense Refill     aspirin (ASA) 81 MG tablet Take 81 mg by mouth        "cetirizine (ZYRTEC) 10 MG tablet Take 1 tablet (10 mg) by mouth daily as needed for allergies 90 tablet 1     fluticasone (FLONASE) 50 MCG/ACT nasal spray USE 2 SPRAYS INTO EACH NOSTRIL DAILY 16 g 3     olopatadine (PATANOL) 0.1 % ophthalmic solution Place 1 drop into both eyes 2 times daily 5 mL 3     simvastatin (ZOCOR) 40 MG tablet TAKE ONE TABLET BY MOUTH EVERY NIGHT AT BEDTIME 90 tablet 3       Reviewed and updated as needed this visit by clinical staff  Tobacco  Allergies  Meds              Reviewed and updated as needed this visit by Provider                Patient Active Problem List   Diagnosis     Prediabetes     Hyperlipidemia LDL goal <160     ALLERGIES   No Known Allergies      Review of Systems  CONSTITUTIONAL: NEGATIVE for fever, chills, change in weight  INTEGUMENTARY/SKIN: NEGATIVE for worrisome rashes, moles or lesions  EYES: NEGATIVE for vision changes or irritation  ENT: NEGATIVE for ear, mouth and throat problems  RESP: NEGATIVE for significant cough or SOB  CV: NEGATIVE for chest pain, palpitations or peripheral edema  GI: NEGATIVE for nausea, abdominal pain, heartburn, or change in bowel habits   male: negative for dysuria, hematuria, decreased urinary stream, erectile dysfunction, urethral discharge  MUSCULOSKELETAL: NEGATIVE for significant arthralgias or myalgia  NEURO: NEGATIVE for weakness, dizziness or paresthesias  PSYCHIATRIC: NEGATIVE for changes in mood or affect    OBJECTIVE:   /60 (BP Location: Right arm, Patient Position: Sitting, Cuff Size: Adult Regular)   Pulse 68   Temp 98.9  F (37.2  C) (Temporal)   Resp 18   Ht 1.626 m (5' 4\")   Wt 64.1 kg (141 lb 6.4 oz)   SpO2 99%   BMI 24.27 kg/m      Physical Exam  GENERAL: healthy, alert and no distress  EYES: Eyes grossly normal to inspection, PERRL and conjunctivae and sclerae normal  HENT: ear canals and TM's normal, nose and mouth without ulcers or lesions  NECK: no adenopathy, no asymmetry, masses, or scars and " "thyroid normal to palpation  RESP: lungs clear to auscultation - no rales, rhonchi or wheezes  CV: regular rate and rhythm, normal S1 S2, no S3 or S4, no murmur, click or rub, no peripheral edema and peripheral pulses strong  ABDOMEN: soft, nontender, no hepatosplenomegaly, no masses and bowel sounds normal  MS: no gross musculoskeletal defects noted, no edema  SKIN: no suspicious lesions or rashes  NEURO: Normal strength and tone, mentation intact and speech normal  PSYCH: mentation appears normal, affect normal/bright    ASSESSMENT/PLAN:   (Z00.00) Routine general medical examination at a health care facility  (primary encounter diagnosis)    (R73.03) Prediabetes  Comment:   Plan: Hemoglobin A1c          (E78.5) Hyperlipidemia LDL goal <160  Comment:   Plan: Lipid panel reflex to direct LDL Fasting,         Comprehensive metabolic panel          (G47.30) Sleep apnea, unspecified type  Comment: prior referral, did not followthrough due to covid, requests referral  Plan: SLEEP EVALUATION & MANAGEMENT REFERRAL - [facility]         [phone]  (Age 18 and up)          (J30.2) Seasonal allergic rhinitis, unspecified trigger  Comment: refill  Plan: cetirizine (ZYRTEC) 10 MG tablet          (H10.13) Allergic conjunctivitis, bilateral  Comment:   Plan: olopatadine (PATANOL) 0.1 % ophthalmic solution          (Z11.59) Need for hepatitis C screening test  Comment:   Plan: Hepatitis C Screen Reflex to HCV RNA Quant and         Genotype              Patient has been advised of split billing requirements and indicates understanding: Yes  COUNSELING:   Reviewed preventive health counseling, as reflected in patient instructions       Regular exercise       Healthy diet/nutrition       Colon cancer screening       Prostate cancer screening    Estimated body mass index is 24.27 kg/m  as calculated from the following:    Height as of this encounter: 1.626 m (5' 4\").    Weight as of this " encounter: 64.1 kg (141 lb 6.4 oz).       Counseling Resources:  ATP IV Guidelines  Pooled Cohorts Equation Calculator  FRAX Risk Assessment  ICSI Preventive Guidelines  Dietary Guidelines for Americans, 2010  USDA's MyPlate  ASA Prophylaxis  Lung CA Screening    Junior Ge MD  [facility]

## 2021-04-21 NOTE — LETTER
Referred By  Referred To   Junior Ge MD   [address] [place] [address]   Phone: [phone]   Fax: [phone]    Diagnoses: Sleep apnea, unspecified type   Order: Sleep Evaluation & Management Referral - [facility] [phone] (Age 18 And Up)

## 2021-04-22 LAB
ALBUMIN SERPL-MCNC: 4.1 G/DL (ref 3.4–5)
ALP SERPL-CCNC: 104 U/L (ref 40–150)
ALT SERPL W P-5'-P-CCNC: 29 U/L (ref 0–70)
ANION GAP SERPL CALCULATED.3IONS-SCNC: 6 MMOL/L (ref 3–14)
AST SERPL W P-5'-P-CCNC: 20 U/L (ref 0–45)
BILIRUB SERPL-MCNC: 0.6 MG/DL (ref 0.2–1.3)
BUN SERPL-MCNC: 7 MG/DL (ref 7–30)
CALCIUM SERPL-MCNC: 9.7 MG/DL (ref 8.5–10.1)
CHLORIDE SERPL-SCNC: 103 MMOL/L (ref 94–109)
CHOLEST SERPL-MCNC: 127 MG/DL
CO2 SERPL-SCNC: 28 MMOL/L (ref 20–32)
CREAT SERPL-MCNC: 0.79 MG/DL (ref 0.66–1.25)
GFR SERPL CREATININE-BSD FRML MDRD: >90 ML/MIN/{1.73_M2}
GLUCOSE SERPL-MCNC: 94 MG/DL (ref 70–99)
HCV AB SERPL QL IA: NONREACTIVE
HDLC SERPL-MCNC: 50 MG/DL
LDLC SERPL CALC-MCNC: 66 MG/DL
NONHDLC SERPL-MCNC: 77 MG/DL
POTASSIUM SERPL-SCNC: 4.4 MMOL/L (ref 3.4–5.3)
PROT SERPL-MCNC: 8.1 G/DL (ref 6.8–8.8)
SODIUM SERPL-SCNC: 136 MMOL/L (ref 133–144)
TRIGL SERPL-MCNC: 58 MG/DL

## 2021-05-13 ENCOUNTER — OFFICE VISIT (OUTPATIENT)
Dept: PEDIATRICS | Facility: CLINIC | Age: 61
End: 2021-05-13
Payer: COMMERCIAL

## 2021-05-13 VITALS
DIASTOLIC BLOOD PRESSURE: 68 MMHG | SYSTOLIC BLOOD PRESSURE: 124 MMHG | BODY MASS INDEX: 25.1 KG/M2 | OXYGEN SATURATION: 100 % | WEIGHT: 146.2 LBS | HEART RATE: 71 BPM | TEMPERATURE: 97.3 F

## 2021-05-13 DIAGNOSIS — H10.13 ALLERGIC CONJUNCTIVITIS, BILATERAL: ICD-10-CM

## 2021-05-13 DIAGNOSIS — H04.203 WATERY EYES: ICD-10-CM

## 2021-05-13 DIAGNOSIS — J30.89 ENVIRONMENTAL AND SEASONAL ALLERGIES: Primary | ICD-10-CM

## 2021-05-13 DIAGNOSIS — J00 ACUTE RHINITIS: ICD-10-CM

## 2021-05-13 DIAGNOSIS — J39.2 THROAT DRYNESS: ICD-10-CM

## 2021-05-13 PROCEDURE — 99213 OFFICE O/P EST LOW 20 MIN: CPT | Performed by: NURSE PRACTITIONER

## 2021-05-13 RX ORDER — FLUTICASONE PROPIONATE 50 MCG
SPRAY, SUSPENSION (ML) NASAL
Qty: 16 G | Refills: 3 | Status: SHIPPED | OUTPATIENT
Start: 2021-05-13 | End: 2022-06-08

## 2021-05-13 RX ORDER — OLOPATADINE HYDROCHLORIDE 1 MG/ML
1 SOLUTION/ DROPS OPHTHALMIC 2 TIMES DAILY
Qty: 5 ML | Refills: 3 | Status: SHIPPED | OUTPATIENT
Start: 2021-05-13 | End: 2022-06-08

## 2021-05-13 NOTE — PATIENT INSTRUCTIONS
Chadwick Kauffman    It was nice meeting you today!  I referred you to ENT for your allergies.    Please let me know if you have any questions,    Charlee Betts, KYLE  Family Medicine

## 2021-05-13 NOTE — PROGRESS NOTES
Assessment & Plan     Environmental and seasonal allergies  Referred to ENT  Refilled medication    - OTOLARYNGOLOGY REFERRAL  - fluticasone (FLONASE) 50 MCG/ACT nasal spray; USE 2 SPRAYS INTO EACH NOSTRIL DAILY    Watery eyes  Referred to ENT  Refilled medication  - OTOLARYNGOLOGY REFERRAL  - olopatadine (PATANOL) 0.1 % ophthalmic solution; Place 1 drop into both eyes 2 times daily    Acute rhinitis  Referred to ENT  Refilled medication  - OTOLARYNGOLOGY REFERRAL    Throat dryness  Referred to ENT  Refilled medication  - OTOLARYNGOLOGY REFERRAL    Allergic conjunctivitis, bilateral  Referred to ENT  Refilled medication  - olopatadine (PATANOL) 0.1 % ophthalmic solution; Place 1 drop into both eyes 2 times daily      I spent a total of 7 minutes on the day of the visit.   Time spent doing chart review, history and exam, documentation and further activities per the note       See Patient Instructions  Return if symptoms worsen or fail to improve.    Charlee Betts NP  [facility] PRADEEP Godoy is a 60 year old who presents for the following health issues:     HPI     Allergies:  -Environmental allergies  -Was receiving allergy shots until 2018  -Needs referral  -Current symptoms--watery eyes, sneezing, dry throat  -Using Flonase, Patanol, and Zyrtec with minimal relief      Review of Systems   Constitutional, HEENT, cardiovascular, pulmonary, gi and gu systems are negative, except as otherwise noted.      Objective    /68 (BP Location: Right arm, Patient Position: Chair, Cuff Size: Adult Regular)   Pulse 71   Temp 97.3  F (36.3  C) (Tympanic)   Wt 66.3 kg (146 lb 3.2 oz)   SpO2 100%   BMI 25.10 kg/m         Body mass index is 25.1 kg/m .     Physical Exam   GENERAL: healthy, alert and no distress  EYES: PERRL,  conjunctiva/corneas- conjunctival injection OU  HENT: normal cephalic/atraumatic, ear canals and TM's normal, nose and mouth without ulcers or lesions, oropharynx clear  and oral mucous membranes moist  NECK: no adenopathy, no asymmetry, masses, or scars and thyroid normal to palpation  RESP: lungs clear to auscultation - no rales, rhonchi or wheezes  CV: regular rate and rhythm, normal S1 S2, no S3 or S4, no murmur, click or rub, no peripheral edema and peripheral pulses strong  SKIN: no suspicious lesions or rashes  PSYCH: mentation appears normal, affect normal/bright

## 2021-05-14 ENCOUNTER — VIRTUAL VISIT (OUTPATIENT)
Dept: SLEEP MEDICINE | Facility: CLINIC | Age: 61
End: 2021-05-14
Attending: INTERNAL MEDICINE
Payer: COMMERCIAL

## 2021-05-14 VITALS — BODY MASS INDEX: 24.92 KG/M2 | HEIGHT: 64 IN | WEIGHT: 146 LBS

## 2021-05-14 DIAGNOSIS — R06.83 SNORING: ICD-10-CM

## 2021-05-14 DIAGNOSIS — R06.81 WITNESSED APNEIC SPELLS: ICD-10-CM

## 2021-05-14 DIAGNOSIS — R29.818 SUSPECTED SLEEP APNEA: Primary | ICD-10-CM

## 2021-05-14 PROCEDURE — 99203 OFFICE O/P NEW LOW 30 MIN: CPT | Mod: 95 | Performed by: INTERNAL MEDICINE

## 2021-05-14 ASSESSMENT — MIFFLIN-ST. JEOR: SCORE: 1393.25

## 2021-05-14 NOTE — PROGRESS NOTES
"Pt has MA.    Walt Yin is a 60 year old male being evaluated via a billable telephone visit.     \"This telephone visit will be conducted via a call between you and your physician/provider. We have found that certain health care needs can be provided without the need for an in-person visit or physical exam.  This service lets us provide the care you need with a telephone conversation.  If a prescription is necessary we can send it directly to your pharmacy.  If lab work is needed we can place an order for that and you can then stop by our lab to have the test done at a later time.\"    Telephone visits are billed at different rates depending on your insurance coverage.  Please reach out to your insurance provider with any questions.    Patient has given verbal consent for  a Telephone visit? Yes    What telephone number would you like your provider to contact at at:  [phone]    How would you like to obtain your AVS? Jabari Crabtree MA    Telephone Visit Details:     Telephone Visit Start Time: 10:36 AM    Telephone Visit End Time:10:47 AM    Sleep Consultation:    Date on this visit: 5/14/2021    Walt Yin is sent by Junior Ge for a sleep consultation regarding possible sleep apnea.    Primary Physician: Junior Ge     Walt Yin reports nightly snoring for many years.     His medical history is significant for hyperlipidemia.     Walt does snore every night. Patient does have a regular bed partner. There is report of snoring.  He does have witnessed apneas. They never sleep separately.  Patient sleeps on his back and side. He denies no morning headaches and restless legs. Walt denies any bruxism, sleep walking, sleep talking, dream enactment, sleep paralysis, cataplexy and hypnogogic/hypnopompic hallucinations.    Walt goes to sleep at 11:00 PM during the week. He wakes up at 9:00 AM without an alarm. He falls asleep in 15 minutes.  Walt " denies difficulty falling asleep.  He wakes up 1 times a night for 10 minutes before falling back to sleep.  Walt wakes up to uncertain reasons.  On weekends, Walt goes to sleep at 11:00 PM.  He wakes up at 9:00 AM without an alarm. He falls asleep in 15 minutes.  Patient gets an average of 9 hours of sleep per night.     Patient reports feeling tired during the day. Patient's [place] Sleepiness score 5/24 consistent with no daytime sleepiness.      Walt naps 0 times per week. He takes no inadvertant naps.  He denies closing eyes, dozing and falling asleep while driving. Patient was counseled on the importance of driving while alert, to pull over if drowsy, or nap before getting into the vehicle if sleepy.      He uses 1 cups/day of tea. Last caffeine intake is usually before noon.    He drinks alcohol, 2-3 beers at a time, 3-4 days per week.     Allergies:    No Known Allergies    Medications:    Current Outpatient Medications   Medication Sig Dispense Refill     aspirin (ASA) 81 MG tablet Take 81 mg by mouth       cetirizine (ZYRTEC) 10 MG tablet Take 1 tablet (10 mg) by mouth daily as needed for allergies 90 tablet 1     fluticasone (FLONASE) 50 MCG/ACT nasal spray USE 2 SPRAYS INTO EACH NOSTRIL DAILY 16 g 3     olopatadine (PATANOL) 0.1 % ophthalmic solution Place 1 drop into both eyes 2 times daily 5 mL 3     simvastatin (ZOCOR) 40 MG tablet TAKE ONE TABLET BY MOUTH EVERY NIGHT AT BEDTIME 90 tablet 3       Problem List:  Patient Active Problem List    Diagnosis Date Noted     Prediabetes 02/08/2019     Priority: Medium     Hyperlipidemia LDL goal <160 02/08/2019     Priority: Medium        Past Medical/Surgical History:  No past medical history on file.  No past surgical history on file.    Social History:  Social History     Socioeconomic History     Marital status:      Spouse name: Not on file     Number of children: Not on file     Years of education: Not on file     Highest education  level: Not on file   Occupational History     Not on file   Social Needs     Financial resource strain: Not on file     Food insecurity     Worry: Not on file     Inability: Not on file     Transportation needs     Medical: Not on file     Non-medical: Not on file   Tobacco Use     Smoking status: Never Smoker     Smokeless tobacco: Current User   Substance and Sexual Activity     Alcohol use: Yes     Comment: Social     Drug use: No     Sexual activity: Not on file   Lifestyle     Physical activity     Days per week: Not on file     Minutes per session: Not on file     Stress: Not on file   Relationships     Social connections     Talks on phone: Not on file     Gets together: Not on file     Attends Jainism service: Not on file     Active member of club or organization: Not on file     Attends meetings of clubs or organizations: Not on file     Relationship status: Not on file     Intimate partner violence     Fear of current or ex partner: Not on file     Emotionally abused: Not on file     Physically abused: Not on file     Forced sexual activity: Not on file   Other Topics Concern     Parent/sibling w/ CABG, MI or angioplasty before 65F 55M? Not Asked   Social History Narrative     Not on file       Family History:    Mother and brother snores loudly.     Review of Systems:  A complete review of systems reviewed by me is negative with the exeption of what has been mentioned in the history of present illness.  CONSTITUTIONAL: NEGATIVE for weight gain/loss, fever, chills, sweats or night sweats, drug allergies.  EYES: NEGATIVE for changes in vision, blind spots, double vision.  ENT: NEGATIVE for ear pain, sore throat, sinus pain, post-nasal drip, runny nose, bloody nose  CARDIAC: NEGATIVE for fast heartbeats or fluttering in chest, chest pain or pressure, breathlessness when lying flat, swollen legs or swollen feet.  NEUROLOGIC: NEGATIVE headaches, weakness or numbness in the arms or legs.  DERMATOLOGIC:  "NEGATIVE for rashes, new moles or change in mole(s)  PULMONARY: NEGATIVE SOB at rest, SOB with activity, dry cough, productive cough, coughing up blood, wheezing or whistling when breathing.    GASTROINTESTINAL: NEGATIVE for nausea or vomitting, loose or watery stools, fat or grease in stools, constipation, abdominal pain, bowel movements black in color or blood noted.  GENITOURINARY: NEGATIVE for pain during urination, blood in urine, urinating more frequently than usual, irregular menstrual periods.  MUSCULOSKELETAL: NEGATIVE for muscle pain, bone or joint pain, swollen joints.  ENDOCRINE: NEGATIVE for increased thirst or urination, diabetes.  LYMPHATIC: NEGATIVE for swollen lymph nodes, lumps or bumps in the breasts or nipple discharge.    Screening:  Vitals: Ht 1.626 m (5' 4\")   Wt 66.2 kg (146 lb)   BMI 25.06 kg/m    BMI= Body mass index is 25.06 kg/m .         [place] Total Score 5/14/2021   Total score - [place] 5       KATIA Total Score: 2 (05/14/21 1000)    Impression/Plan:    1. Possible Obstructive sleep apnea    Patient is a 60 years old male, BMI 25, with no significant medical comorbidity, who presents with history of snoring and witnessed apneas. There is an intermediate to high risk for sleep apnea and an overnight sleep study is recommended for assessment.     Plan:     1. PSG for assessment of sleep apnea    Literature provided regarding sleep apnea.      He will follow up with me in approximately two weeks after his sleep study has been competed to review the results and discuss plan of care.       Polysomnography reviewed.  Obstructive sleep apnea reviewed.  Complications of untreated sleep apnea were reviewed.    Dr. Juan Rdz     CC: Junior Ge        "

## 2021-05-14 NOTE — PATIENT INSTRUCTIONS
"MY TREATMENT INFORMATION FOR SLEEP APNEA-  Walt Yin    DOCTOR : Juan Rdz MD, MD    Am I having a sleep study at a sleep center?  --->Due to insurance clearance delays, you will be contacted within 2-4 weeks to schedule    Am I having a home sleep study?  --->Watch the video for the device you are using:    -/drop off device-   https://www.Qualiall.com/watch?v=yGGFBdELGhk    -Disposable device sent out require phone/computer application-   https://www.Qualiall.com/watch?v=BCce_vbiwxE      Frequently asked questions:  1. What is Obstructive Sleep Apnea (ZENIA)? ZENIA is the most common type of sleep apnea. Apnea means, \"without breath.\"  Apnea is most often caused by narrowing or collapse of the upper airway as muscles relax during sleep.   Almost everyone has occasional apneas. Most people with sleep apnea have had brief interruptions at night frequently for many years.  The severity of sleep apnea is related to how frequent and severe the events are.   2. What are the consequences of ZENIA? Symptoms include: feeling sleepy during the day, snoring loudly, gasping or stopping of breathing, trouble sleeping, and occasionally morning headaches or heartburn at night.  Sleepiness can be serious and even increase the risk of falling asleep while driving. Other health consequences may include development of high blood pressure and other cardiovascular disease in persons who are susceptible. Untreated ZENIA  can contribute to heart disease, stroke and diabetes.   3. What are the treatment options? In most situations, sleep apnea is a lifelong disease that must be managed with daily therapy. Medications are not effective for sleep apnea and surgery is generally not considered until other therapies have been tried. Your treatment is your choice . Continuous Positive Airway (CPAP) works right away and is the therapy that is effective in nearly everyone. An oral device to hold your jaw forward is usually the next most " reliable option. Other options include postioning devices (to keep you off your back), weight loss, and surgery including a tongue pacing device. There is more detail about some of these options below.  4. Are my sleep studies covered by insurance? Although we will request verification of coverage, we advise you also check in advance of the study to ensure there is coverage.    Important tips for those choosing CPAP and similar devices   Know your equipment:  CPAP is continuous positive airway pressure that prevents obstructive sleep apnea by keeping the throat from collapsing while you are sleeping. In most cases, the device is  smart  and can slowly self-adjusts if your throat collapses and keeps a record every day of how well you are treated-this information is available to you and your care team.  BPAP is bilevel positive airway pressure that keeps your throat open and also assists each breath with a pressure boost to maintain adequate breathing.  Special kinds of BPAP are used in patients who have inadequate breathing from lung or heart disease. In most cases, the device is  smart  and can slowly self-adjusts to assist breathing. Like CPAP, the device keeps a record of how well you are treated.  Your mask is your connection to the device. You get to choose what feels most comfortable and the staff will help to make sure if fits. Here: are some examples of the different masks that are available:       Key points to remember on your journey with sleep apnea:  1. Sleep study.  PAP devices often need to be adjusted during a sleep study to show that they are effective and adjusted right.  2. Good tips to remember: Try wearing just the mask during a quiet time during the day so your body adapts to wearing it. A humidifier is recommended for comfort in most cases to prevent drying of your nose and throat. Allergy medication from your provider may help you if you are having nasal congestion.  3. Getting settled-in. It  takes more than one night for most of us to get used to wearing a mask. Try wearing just the mask during a quiet time during the day so your body adapts to wearing it. A humidifier is recommended for comfort in most cases. Our team will work with you carefully on the first day and will be in contact within 4 days and again at 2 and 4 weeks for advice and remote device adjustments. Your therapy is evaluated by the device each day.   4. Use it every night. The more you are able to sleep naturally for 7-8 hours, the more likely you will have good sleep and to prevent health risks or symptoms from sleep apnea. Even if you use it 4 hours it helps. Occasionally all of us are unable to use a medical therapy, in sleep apnea, it is not dangerous to miss one night.   5. Communicate. Call our skilled team on the number provided on the first day if your visit for problems that make it difficult to wear the device. Over 2 out of 3 patients can learn to wear the device long-term with help from our team. Remember to call our team or your sleep providers if you are unable to wear the device as we may have other solutions for those who cannot adapt to mask CPAP therapy. It is recommended that you sleep your sleep provider within the first 3 months and yearly after that if you are not having problems.   6. Use it for your health. We encourage use of CPAP masks during daytime quiet periods to allow your face and brain to adapt to the sensation of CPAP so that it will be a more natural sensation to awaken to at night or during naps. This can be very useful during the first few weeks or months of adapting to CPAP though it does not help medically to wear CPAP during wakefulness and  should not be used as a strategy just to meet guidelines.  7. Take care of your equipment. Make sure you clean your mask and tubing using directions every day and that your filter and mask are replaced as recommended or if they are not working.     BESIDES  CPAP, WHAT OTHER THERAPIES ARE THERE?    Positioning Device  Positioning devices are generally used when sleep apnea is mild and only occurs on your back.This example shows a pillow that straps around the waist. It may be appropriate for those whose sleep study shows milder sleep apnea that occurs primarily when lying flat on one's back. Preliminary studies have shown benefit but effectiveness at home may need to be verified by a home sleep test. These devices are generally not covered by medical insurance.  Examples of devices that maintain sleeping on the back to prevent snoring and mild sleep apnea.    Belt type body positioner  http://[employer]Accord/    Electronic reminder  http://nightshifttherapy.com/  http://[email or web address].au/      Oral Appliance  What is oral appliance therapy?  An oral appliance device fits on your teeth at night like a retainer used after having braces. The device is made by a specialized dentist and requires several visits over 1-2 months before a manufactured device is made to fit your teeth and is adjusted to prevent your sleep apnea. Once an oral device is working properly, snoring should be improved. A home sleep test may be recommended at that time if to determine whether the sleep apnea is adequately treated.       Some things to remember:  -Oral devices are often, but not always, covered by your medical insurance. Be sure to check with your insurance provider.   -If you are referred for oral therapy, you will be given a list of specialized dentists to consider or you may choose to visit the Web site of the American Academy of Dental Sleep Medicine  -Oral devices are less likely to work if you have severe sleep apnea or are extremely overweight.     More detailed information  An oral appliance is a small acrylic device that fits over the upper and lower teeth  (similar to a retainer or a mouth guard). This device slightly moves jaw forward, which moves the base of the tongue forward,  opens the airway, improves breathing for effective treat snoring and obstructive sleep apnea in perhaps 7 out of 10 people .  The best working devices are custom-made by a dental device  after a mold is made of the teeth 1, 2, 3.  When is an oral appliance indicated?  Oral appliance therapy is recommended as a first-line treatment for patients with primary snoring, mild sleep apnea, and for patients with moderate sleep apnea who prefer appliance therapy to use of CPAP4, 5. Severity of sleep apnea is determined by sleep testing and is based on the number of respiratory events per hour of sleep.   How successful is oral appliance therapy?  The success rate of oral appliance therapy in patients with mild sleep apnea is 75-80% while in patients with moderate sleep apnea it is 50-70%. The chance of success in patients with severe sleep apnea is 40-50%. The research also shows that oral appliances have a beneficial effect on the cardiovascular health of ZENIA patients at the same magnitude as CPAP therapy7.  Oral appliances should be a second-line treatment in cases of severe sleep apnea, but if not completely successful then a combination therapy utilizing CPAP plus oral appliance therapy may be effective. Oral appliances tend to be effective in a broad range of patients although studies show that the patients who have the highest success are females, younger patients, those with milder disease, and less severe obesity. 3, 6.   Finding a dentist that practices dental sleep medicine  Specific training is available through the American Academy of Dental Sleep Medicine for dentists interested in working in the field of sleep. To find a dentist who is educated in the field of sleep and the use of oral appliances, near you, visit the Web site of the American Academy of Dental Sleep Medicine.    References  1. Maite et al. Objectively measured vs self-reported compliance during oral appliance therapy for  sleep-disordered breathing. Chest 2013; 144(5): 6290-1373.  2. Millie, et al. Objective measurement of compliance during oral appliance therapy for sleep-disordered breathing. Thorax 2013; 68(1): 91-96.  3. Joseph et al. Mandibular advancement devices in 620 men and women with ZENIA and snoring: tolerability and predictors of treatment success. Chest 2004; 125: 3748-7767.  4. Jennifer et al. Oral appliances for snoring and ZENIA: a review. Sleep 2006; 29: 244-262.  5. Alejandrina et al. Oral appliance treatment for ZENIA: an update. J Clin Sleep Med 2014; 10(2): 215-227.  6. Denise et al. Predictors of OSAH treatment outcome. J Dent Res 2007; 86: 9093-0595.      Weight Loss:    Weight loss is a long-term strategy that may improve sleep apnea in some patients.    Weight management is a personal decision and the decision should be based on your interest and the potential benefits.  If you are interested in exploring weight loss strategies, the following discussion covers the impact on weight loss on sleep apnea and the approaches that may be successful.    Being overweight does not necessarily mean you will have health consequences.  Those who have BMI over 35 or over 27 with existing medical conditions carries greater risk.   Weight loss decreases severity of sleep apnea in most people with obesity. For those with mild obesity who have developed snoring with weight gain, even 15-30 pound weight loss can improve and occasionally eliminate sleep apnea.  Structured and life-long dietary and health habits are necessary to lose weight and keep healthier weight levels.     Though there may be significant health benefits from weight loss, long-term weight loss is very difficult to achieve- studies show success with dietary management in less than 10% of people. In addition, substantial weight loss may require years of dietary control and may be difficult if patients have severe obesity. In these cases, surgical  management may be considered.  Finally, older individuals who have tolerated obesity without health complications may be less likely to benefit from weight loss strategies.        Your BMI is Body mass index is 25.06 kg/m .  Weight management is a personal decision.  If you are interested in exploring weight loss strategies, the following discussion covers the approaches that may be successful. Body mass index (BMI) is one way to tell whether you are at a healthy weight, overweight, or obese. It measures your weight in relation to your height.  A BMI of 18.5 to 24.9 is in the healthy range. A person with a BMI of 25 to 29.9 is considered overweight, and someone with a BMI of 30 or greater is considered obese. More than two-thirds of American adults are considered overweight or obese.  Being overweight or obese increases the risk for further weight gain. Excess weight may lead to heart disease and diabetes.  Creating and following plans for healthy eating and physical activity may help you improve your health.  Weight control is part of healthy lifestyle and includes exercise, emotional health, and healthy eating habits. Careful eating habits lifelong are the mainstay of weight control. Though there are significant health benefits from weight loss, long-term weight loss with diet alone may be very difficult to achieve- studies show long-term success with dietary management in less than 10% of people. Attaining a healthy weight may be especially difficult to achieve in those with severe obesity. In some cases, medications, devices and surgical management might be considered.  What can you do?  If you are overweight or obese and are interested in methods for weight loss, you should discuss this with your provider.     Consider reducing daily calorie intake by 500 calories.     Keep a food journal.     Avoiding skipping meals, consider cutting portions instead.    Diet combined with exercise helps maintain muscle while  optimizing fat loss. Strength training is particularly important for building and maintaining muscle mass. Exercise helps reduce stress, increase energy, and improves fitness. Increasing exercise without diet control, however, may not burn enough calories to loose weight.       Start walking three days a week 10-20 minutes at a time    Work towards walking thirty minutes five days a week     Eventually, increase the speed of your walking for 1-2 minutes at time    In addition, we recommend that you review healthy lifestyles and methods for weight loss available through the National Institutes of Health patient information sites:  http://win.niddk.nih.gov/publications/index.htm    And look into health and wellness programs that may be available through your health insurance provider, employer, local community center, or [employer]via club.    Weight management plan: Patient was referred to their PCP to discuss a diet and exercise plan.    Surgery:    Surgery for obstructive sleep apnea is considered generally only when other therapies fail to work. Surgery may be discussed with you if you are having a difficult time tolerating CPAP and or when there is an abnormal structure that requires surgical correction.  Nose and throat surgeries often enlarge the airway to prevent collapse.  Most of these surgeries create pain for 1-2 weeks and up to half of the most common surgeries are not effective throughout life.  You should carefully discuss the benefits and drawbacks to surgery with your sleep provider and surgeon to determine if it is the best solution for you.   More information  Surgery for ZENIA is directed at areas that are responsible for narrowing or complete obstruction of the airway during sleep.  There are a wide range of procedures available to enlarge and/or stabilize the airway to prevent blockage of breathing in the three major areas where it can occur: the palate, tongue, and nasal regions.  Successful surgical  treatment depends on the accurate identification of the factors responsible for obstructive sleep apnea in each person.  A personalized approach is required because there is no single treatment that works well for everyone.  Because of anatomic variation, consultation with an examination by a sleep surgeon is a critical first step in determining what surgical options are best for each patient.  In some cases, examination during sedation may be recommended in order to guide the selection of procedures.  Patients will be counseled about risks and benefits as well as the typical recovery course after surgery. Surgery is typically not a cure for a person s ZENIA.  However, surgery will often significantly improve one s ZENIA severity (termed  success rate ).  Even in the absence of a cure, surgery will decrease the cardiovascular risk associated with OSA7; improve overall quality of life8 (sleepiness, functionality, sleep quality, etc).      Palate Procedures:  Patients with ZENIA often have narrowing of their airway in the region of their tonsils and uvula.  The goals of palate procedures are to widen the airway in this region as well as to help the tissues resist collapse.  Modern palate procedure techniques focus on tissue conservation and soft tissue rearrangement, rather than tissue removal.  Often the uvula is preserved in this procedure. Residual sleep apnea is common in patient after pharyngoplasty with an average reduction in sleep apnea events of 33%2.      Tongue Procedures:  ExamWhile patients are awake, the muscles that surround the throat are active and keep this region open for breathing. These muscles relax during sleep, allowing the tongue and other structures to collapse and block breathing.  There are several different tongue procedures available.  Selection of a tongue base procedure depends on characteristics seen on physical exam.  Generally, procedures are aimed at removing bulky tissues in this area or  preventing the back of the tongue from falling back during sleep.  Success rates for tongue surgery range from 50-62%3.    Hypoglossal Nerve Stimulation:  Hypoglossal nerve stimulation has recently received approval from the [place] Food and Drug Administration for the treatment of obstructive sleep apnea.  This is based on research showing that the system was safe and effective in treating sleep apnea6.  Results showed that the median AHI score decreased 68%, from 29.3 to 9.0. This therapy uses an implant system that senses breathing patterns and delivers mild stimulation to airway muscles, which keeps the airway open during sleep.  The system consists of three fully implanted components: a small generator (similar in size to a pacemaker), a breathing sensor, and a stimulation lead.  Using a small handheld remote, a patient turns the therapy on before bed and off upon awakening.    Candidates for this device must be greater than 22 years of age, have moderate to severe ZENIA (AHI between 20-65), BMI less than 32, have tried CPAP/oral appliance without success, and have appropriate upper airway anatomy (determined by a sleep endoscopy performed by Dr. Maravilla).    Hypoglossal Nerve Stimulation Pathway:    The sleep surgeon s office will work with the patient through the insurance prior-authorization process (including communications and appeals).    Nasal Procedures:  Nasal obstruction can interfere with nasal breathing during the day and night.  Studies have shown that relief of nasal obstruction can improve the ability of some patients to tolerate positive airway pressure therapy for obstructive sleep apnea1.  Treatment options include medications such as nasal saline, topical corticosteroid and antihistamine sprays, and oral medications such as antihistamines or decongestants. Non-surgical treatments can include external nasal dilators for selected patients. If these are not successful by themselves, surgery can  improve the nasal airway either alone or in combination with these other options.      Combination Procedures:  Combination of surgical procedures and other treatments may be recommended, particularly if patients have more than one area of narrowing or persistent positional disease.  The success rate of combination surgery ranges from 66-80%2,3.    References  1. Nohelia LAKHANI. The Role of the Nose in Snoring and Obstructive Sleep Apnoea: An Update.  Eur Arch Otorhinolaryngol. 2011; 268: 1365-73.  2.  Simeon SM; Sylwia JA; Rossi JR; Pallanch JF; Tereso MB; Andrei SG; Oneil GUAMAN. Surgical modifications of the upper airway for obstructive sleep apnea in adults: a systematic review and meta-analysis. SLEEP 2010;33(10):6077-5457. Beltran CANTRELL. Hypopharyngeal surgery in obstructive sleep apnea: an evidence-based medicine review.  Arch Otolaryngol Head Neck Surg. 2006 Feb;132(2):206-13.  3. Kurtis YH1, Ce Y, Wale BAYRON. The efficacy of anatomically based multilevel surgery for obstructive sleep apnea. Otolaryngol Head Neck Surg. 2003 Oct;129(4):327-35.  4. Beltran CANTRELL, Goldberg A. Hypopharyngeal Surgery in Obstructive Sleep Apnea: An Evidence-Based Medicine Review. Arch Otolaryngol Head Neck Surg. 2006 Feb;132(2):206-13.  5. Cezar MITCHELL et al. Upper-Airway Stimulation for Obstructive Sleep Apnea.  N Engl J Med. 2014 Jan 9;370(2):139-49.  6. Floyd Y et al. Increased Incidence of Cardiovascular Disease in Middle-aged Men with Obstructive Sleep Apnea. Am J Respir Crit Care Med; 2002 166: 159-165  7. Enrique EM et al. Studying Life Effects and Effectiveness of Palatopharyngoplasty (SLEEP) study: Subjective Outcomes of Isolated Uvulopalatopharyngoplasty. Otolaryngol Head Neck Surg. 2011; 144: 623-631.    Drive Safe... Drive Alive     Sleep health profoundly affects your health, mood, and your safety.  Thirty three percent of the population (one in three of us) is not getting enough sleep and many have a sleep disorder. Not getting  enough sleep or having an untreated / undertreated sleep condition may make us sleepy without even knowing it. In fact, our driving could be dramatically impaired due to our sleep health. As your provider, here are some things I would like you to know about driving:     Here are some warning signs for impairment and dangerous drowsy driving:              -Having been awake more than 16 hours               -Looking tired               -Eyelid drooping              -Head nodding (it could be too late at this point)              -Driving for more than 30 minutes     Some things you could do to make the driving safer if you are experiencing some drowsiness:              -Stop driving and rest              -Call for transportation              -Make sure your sleep disorder is adequately treated     Some things that have been shown NOT to work when experiencing drowsiness while driving:              -Turning on the radio              -Opening windows              -Eating any  distracting  /  entertaining  foods (e.g., sunflower seeds, candy, or any other)              -Talking on the phone      Your decision may not only impact your life, but also the life of others. Please, remember to drive safe for yourself and all of us.

## 2021-06-06 ENCOUNTER — HOSPITAL ENCOUNTER (EMERGENCY)
Facility: CLINIC | Age: 61
Discharge: HOME | End: 2021-06-06
Attending: PHYSICIAN ASSISTANT | Admitting: PHYSICIAN ASSISTANT
Payer: COMMERCIAL

## 2021-06-06 VITALS
TEMPERATURE: 98 F | RESPIRATION RATE: 18 BRPM | OXYGEN SATURATION: 100 % | HEART RATE: 74 BPM | SYSTOLIC BLOOD PRESSURE: 144 MMHG | WEIGHT: 146 LBS | DIASTOLIC BLOOD PRESSURE: 82 MMHG | BODY MASS INDEX: 23.46 KG/M2 | HEIGHT: 66 IN
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
## 2021-06-06 PROCEDURE — 12001 RPR S/N/AX/GEN/TRNK 2.5CM/<: CPT

## 2021-06-06 PROCEDURE — 250N000013 HC RX MED GY IP 250 OP 250 PS 637: Performed by: PHYSICIAN ASSISTANT

## 2021-06-06 PROCEDURE — 99283 EMERGENCY DEPT VISIT LOW MDM: CPT

## 2021-06-06 RX ADMIN — AMOXICILLIN AND CLAVULANATE POTASSIUM 1 TABLET: 875; 125 TABLET, FILM COATED ORAL at 21:34

## 2021-06-06 ASSESSMENT — MIFFLIN-ST. JEOR: SCORE: 1420

## 2021-06-06 ASSESSMENT — ENCOUNTER SYMPTOMS
WOUND: 1
NUMBNESS: 0

## 2021-06-07 NOTE — ED PROVIDER NOTES
"  History   Chief Complaint:  Dog bite     HPI   Walt Yin is a 61 year old male who presents with dog bite to the right hand. The patients daughter reports that around 7:15 PM today her dog bite his right hand. She reports that her father was taking a stick away from the dog when the dog bite him. She reports that the dog is vaccinated. He denies numbness and tingling. He reports bites on the palm and abrasions on the top of the hand. The patient denies diabetes. Tetanus 2018.    Review of Systems   Skin: Positive for wound.   Neurological: Negative for numbness.        Negative tingling    All other systems reviewed and are negative.    Allergies:  No Known Allergies    Medications:  Nasacort  ASA 81 mg  Zyrtec   Zocor     Past Medical History:    Prediabetes  Hyperlipidemia   Hyperglycemia  Hypertension     Family History:    Cancer   Diabetes   Hypertension     Social History:  The patient presents with his daughter.   The patients primary care is with Dr. Ge.     Physical Exam     Patient Vitals for the past 24 hrs:   BP Temp Pulse Resp SpO2 Height Weight   06/06/21 2006 (!) 144/82 98  F (36.7  C) 74 18 100 % -- --   06/06/21 1954 135/87 97.2  F (36.2  C) 68 16 100 % 1.676 m (5' 6\") 66.2 kg (146 lb)       Physical Exam  HEENT: mmm  Eyes: PERRL B/L  Neck: Supple  CV: Peripheral pulses intact and regular  Resp: Speaking in full sentences without any respiratory distress  Ext:  Right Hand  2.5 Laceration noted to palm, as well as puncture marks to palm and dorsum of hand.  No bony TTP.  Full ROM of hand.  <2 sec cap refill to all fingers.  Normal  strength.  Sensation intact through hand.  Remainder of the skeletal survey is unremarkable  Skin: warm dry well perfused. See above.  Neuro: Alert  Nursing notes and vital signs reviewed.      Emergency Department Course     Procedures     Laceration Repair      LACERATION:  A simple and superficial clean 2.5 cm laceration.    LOCATION:  Right " hand.    FUNCTION:  Distally sensation and circulation are intact.    ANESTHESIA:  Local using bupivacaine 0.5 % total of 2 mLs.     PREPARATION:  Irrigation with Shur Clens.     DEBRIDEMENT:  no debridement.    CLOSURE:  Wound was closed with One Layer.  Skin loosely approximated with 2 x 4.0 Ethylon using interrupted sutures.    Emergency Department Course:    Reviewed:  I reviewed nursing notes, vitals, past medical history and care everywhere    Assessments:  2037 I obtained history and examined the patient as noted above.   2121 I preformed the laceration repair.     Interventions:  2134 Augmentin 1 tablet, Oral     Disposition:  The patient was discharged to home.       Impression & Plan   Medical Decision Making:  Walt Yin is a 61 year old male who presents for evaluation of a dog bite to the right hand.  The workup here in the ED shows no signs of compartment syndrome, significant lacerations, tendon or bone injury.  No signs of foreign body or dog teeth in wound.  Dog is known so will have them observe for signs of rabies; no rabies shots indicated from ED.  The wounds were scrubbed and washed out with high pressure irrigation. Did place two sutures loosely approximated to laceration given length, degree of gaping. Will start augmentin and have them observe for signs of infection (pain, redness, warmth, red streaks, etc).  Advised removal of sutures in 10 days. F/u with PCP with any concerns regarding healing. Discussed reasons to return. All questions answered. Patient discharged to home in stable condition.     Covid-19  Walt Yin was evaluated during a global COVID-19 pandemic, which necessitated consideration that the patient might be at risk for infection with the SARS-CoV-2 virus that causes COVID-19.   Applicable protocols for evaluation were followed during the patient's care.   COVID-19 was considered as part of the patient's evaluation.     Diagnosis:    ICD-10-CM    1. Dog bite,  initial encounter  W54.0XXA        Discharge Medications:  New Prescriptions    AMOXICILLIN-CLAVULANATE (AUGMENTIN) 875-125 MG TABLET    Take 1 tablet by mouth 2 times daily for 5 days       Scribe Disclosure:  I, Lara Durham, am serving as a scribe at 8:40 PM on 6/6/2021 to document services personally performed by Buck Berg PA-C based on my observations and the provider's statements to me.     This record was created at least in part using electronic voice recognition software, so please excuse any typographical errors.         Buck Berg PA-C  06/07/21 0001

## 2021-07-05 ENCOUNTER — THERAPY VISIT (OUTPATIENT)
Dept: SLEEP MEDICINE | Facility: CLINIC | Age: 61
End: 2021-07-05
Payer: COMMERCIAL

## 2021-07-05 DIAGNOSIS — R29.818 SUSPECTED SLEEP APNEA: ICD-10-CM

## 2021-07-05 DIAGNOSIS — R06.83 SNORING: ICD-10-CM

## 2021-07-05 DIAGNOSIS — R06.81 WITNESSED APNEIC SPELLS: ICD-10-CM

## 2021-07-05 PROCEDURE — 95810 POLYSOM 6/> YRS 4/> PARAM: CPT | Performed by: INTERNAL MEDICINE

## 2021-07-14 NOTE — PROCEDURES
" SLEEP STUDY INTERPRETATION  DIAGNOSTIC POLYSOMNOGRAPHY REPORT      Patient: GEORGE VALERIO  YOB: 1960  Study Date: 05/07/2021  MRN: [id number]  Referring Provider: MD Ge James  Ordering Provider: MD Rdz Rakesh    Indications for Polysomnography: The patient is a 61 year old Male who is 5' 4\" and weighs 146.0 lbs. His BMI is 25.2, [place] sleepiness scale 5 and neck circumference is 40 cm. Relevant medical history includes hyperlipidemia. A diagnostic polysomnogram was performed to evaluate for sleep apnea.    Polysomnogram Data: A full night polysomnogram recorded the standard physiologic parameters including EEG, EOG, EMG, ECG, nasal and oral airflow. Respiratory parameters of chest and abdominal movements were recorded with respiratory inductance plethysmography. Oxygen saturation was recorded by pulse oximetry. Hypopnea scoring rule used: 1B 4%.    Sleep Architecture: Fragmented sleep.   The total recording time of the polysomnogram was 496.0 minutes. The total sleep time was 464.0 minutes. Sleep latency was decreased at 7.4 minutes without the use of a sleep aid. REM latency was 117.0 minutes. Arousal index was increased at 32.6 arousals per hour. Sleep efficiency was normal at 93.5%. Wake after sleep onset was 24.0 minutes. The patient spent 7.8% of total sleep time in Stage N1, 65.3% in Stage N2, 10.0% in Stage N3, and 16.9% in REM. Time in REM supine was 4.5 minutes.    Respiration: Moderate obstructive sleep apnea with associated oxygen desaturations.     Events ? The polysomnogram revealed a presence of 73 obstructive, 4 central, and - mixed apneas resulting in an apnea index of 10.0 events per hour. There were 69 obstructive hypopneas and - central hypopneas resulting in an obstructive hypopnea index of 8.9 and central hypopnea index of - events per hour. The combined apnea/hypopnea index was 18.9 events per hour (central apnea/hypopnea index was 0.5 events per hour). The REM AHI " was 9.2 events per hour. The supine AHI was 27.7 events per hour. The RERA index was 7.5 events per hour.  The RDI was 26.4 events per hour.    Snoring - was reported as moderate.    Respiratory rate and pattern - was notable for normal respiratory rate and pattern.    Sustained Sleep Associated Hypoventilation - Transcutaneous carbon dioxide monitoring was not used; however significant hypoventilation was not suggested by oximetry.    Sleep Associated Hypoxemia - (Greater than 5 minutes O2 sat at or below 88%) was not present. Baseline oxygen saturation was 95.6%. Lowest oxygen saturation was 71.3%. Time spent less than or equal to 88% was 5.9 minutes. Time spent less than or equal to 89% was 7.6 minutes.    Movement Activity: negative for movement abnormalities.     Periodic Limb Activity - There were - PLMs during the entire study. The PLM index was - movements per hour. The PLM Arousal Index was - per hour.    REM EMG Activity - Excessive transient/sustained muscle activity was not present.    Nocturnal Behavior - Abnormal sleep related behaviors were not noted during/arising out of NREM / REM sleep.     Bruxism - None apparent.    Cardiac Summary: Sinus rhythm with a rare ectopic beat.   The average pulse rate was 58.2 bpm. The minimum pulse rate was 46.9 bpm while the maximum pulse rate was 94.8 bpm.      Assessment:     This sleep study shows moderate obstructive sleep apnea with associated oxygen desaturations and sleep fragmentation. Sleep apnea events were predominately supine position dependent.     Recommendations:    Depending on clinical indications for treatment and patient preference, consider following options.    CPAP therapy is the treatment of choice. Treatment could be empirically initiated with Auto?titrating PAP therapy with a range of 5 to 15 cmH2O. Recommend clinical follow up with sleep management team.    If CPAP is not accepted or tolerated, patient may be a candidate for dental appliance  through referral to Sleep Dentistry for the treatment of obstructive sleep apnea. Dental appliance therapy is likely to be most successful when combined with positional restriction to avoid supine sleep.     If devices are not acceptable or effective, patient may benefit from evaluation of possible surgical options. If he is interested, would recommend referral to specialized ENT-Sleep provider.    Diagnostic Codes:   Obstructive Sleep Apnea G47.33  Repetitive Intrusions Into Sleep F51.8    05/07/2021 [place] Diagnostic Sleep Study (146.0 lbs) - AHI 18.9, RDI 26.4, Supine AHI 27.7, REM AHI 9.2, Low O2 71.3%, Time Spent ?88% 5.9 minutes / Time Spent ?89% 7.6 minutes.     _____________________________________   Electronically Signed By: Juan Rdz MD 07/14/2021

## 2021-07-15 LAB — SLPCOMP: NORMAL

## 2021-07-16 NOTE — PROGRESS NOTES
"HST results and AVS mailed to pt.   Juana Vuong MA    Walt Yin is a 61 year old male being evaluated via a billable telephone visit.     \"This telephone visit will be conducted via a call between you and your physician/provider. We have found that certain health care needs can be provided without the need for an in-person visit or physical exam.  This service lets us provide the care you need with a telephone conversation.  If a prescription is necessary we can send it directly to your pharmacy.  If lab work is needed we can place an order for that and you can then stop by our lab to have the test done at a later time.\"    Telephone visits are billed at different rates depending on your insurance coverage.  Please reach out to your insurance provider with any questions.    Patient has given verbal consent for  a Telephone visit? Yes    What telephone number would you like your provider to contact at at: [phone]    How would you like to obtain your AVS? Mail a copy    Juana Yevgeniy    Telephone Visit Details:     Telephone Visit Start Time: 3:08 PM    Telephone Visit End Time:3:20 PM      Sleep Study Follow-Up Visit:    Date on this visit: 7/19/2021    Walt Yin comes in today for follow-up of his sleep study done on 5/7/21 at the [facility] for possible sleep apnea.    Sleep latency 7.4 minutes without Ambien.  REM achieved.   REM latency 117 minutes.  Sleep efficiency 93.5%. Total sleep time 464 minutes.    Sleep architecture:  Stage 1, 7.8% (5%), stage 2, 65% (45-55%), stage 3, 10% (15-20%), stage REM, 16.9% (20-25%).  AHI was 18.9, with desaturations. RDI 26.4.  REM AHI 9, consistent with mild REM ZENIA.  Supine AHI 27.7, consistent with moderate SUPINE ZENIA.  Periodic Limb Movement Index -/hour.       These findings were reviewed with patient.     Past medical/surgical history, family history, social history, medications and allergies were reviewed.      Problem List:  Patient " Active Problem List    Diagnosis Date Noted     Prediabetes 02/08/2019     Priority: Medium     Hyperlipidemia LDL goal <160 02/08/2019     Priority: Medium        Impression/Plan:    1. Moderate Obstructive sleep apnea    Patient was counseled regarding moderate sleep apnea, consequences of untreated disease and management options. Patient opts for CPAP therapy.     Plan:     1. Start auto PAP therapy     He will follow up with me in about 2 month(s).     Eleven minutes spent with patient, all of which were spent counseling, consulting, coordinating plan of care.      Dr. Juan Rdz     CC: Junior Ge

## 2021-07-19 ENCOUNTER — VIRTUAL VISIT (OUTPATIENT)
Dept: SLEEP MEDICINE | Facility: CLINIC | Age: 61
End: 2021-07-19
Payer: COMMERCIAL

## 2021-07-19 DIAGNOSIS — G47.33 OSA (OBSTRUCTIVE SLEEP APNEA): Primary | ICD-10-CM

## 2021-07-19 PROCEDURE — 99213 OFFICE O/P EST LOW 20 MIN: CPT | Mod: 95 | Performed by: INTERNAL MEDICINE

## 2021-07-27 ENCOUNTER — TELEPHONE (OUTPATIENT)
Dept: SLEEP MEDICINE | Facility: CLINIC | Age: 61
End: 2021-07-27

## 2021-07-27 NOTE — TELEPHONE ENCOUNTER
Spoke to the patient letting Walt know that we received all the documentation for the CPAP. Unfortunately, due to the Respironics recall we are are out of CPAP machines at this time. Let him know that I have put them on a wait list and as soon as we have more machines in stock we will call to schedule the setup.

## 2021-08-11 ENCOUNTER — TELEPHONE (OUTPATIENT)
Dept: SLEEP MEDICINE | Facility: CLINIC | Age: 61
End: 2021-08-11

## 2021-08-11 NOTE — TELEPHONE ENCOUNTER
Reason for call:  Other   Patient called regarding (reason for call): prescription  Additional comments: Per patient: At my previous appt we discussed some sleep medication that'll help me sleep at night but I haven't gotten or heard anything regarding getting this medication.    Phone number to reach patient:  Cell number on file:    Telephone Information:   Mobile [phone]       Best Time:  Anytime    Can we leave a detailed message on this number?  YES    Travel screening: Not Applicable

## 2021-08-13 NOTE — TELEPHONE ENCOUNTER
Spoke with patient, he was calling in regards to his DME orders. He was advised to [facility] to when cpap devices would be in. Patient will follow [facility] with [facility].   Juana Vuong MA

## 2021-09-16 ENCOUNTER — VIRTUAL VISIT (OUTPATIENT)
Dept: PEDIATRICS | Facility: CLINIC | Age: 61
End: 2021-09-16
Payer: COMMERCIAL

## 2021-09-16 ENCOUNTER — LAB (OUTPATIENT)
Dept: LAB | Facility: CLINIC | Age: 61
End: 2021-09-16

## 2021-09-16 DIAGNOSIS — Z71.84 ENCOUNTER FOR COUNSELING FOR TRAVEL: ICD-10-CM

## 2021-09-16 DIAGNOSIS — Z71.84 ENCOUNTER FOR COUNSELING FOR TRAVEL: Primary | ICD-10-CM

## 2021-09-16 PROCEDURE — U0003 INFECTIOUS AGENT DETECTION BY NUCLEIC ACID (DNA OR RNA); SEVERE ACUTE RESPIRATORY SYNDROME CORONAVIRUS 2 (SARS-COV-2) (CORONAVIRUS DISEASE [COVID-19]), AMPLIFIED PROBE TECHNIQUE, MAKING USE OF HIGH THROUGHPUT TECHNOLOGIES AS DESCRIBED BY CMS-2020-01-R: HCPCS

## 2021-09-16 PROCEDURE — U0005 INFEC AGEN DETEC AMPLI PROBE: HCPCS

## 2021-09-16 PROCEDURE — 99212 OFFICE O/P EST SF 10 MIN: CPT | Mod: 95 | Performed by: NURSE PRACTITIONER

## 2021-09-16 NOTE — PROGRESS NOTES
Walt is a 61 year old who is being evaluated via a billable telephone visit.      What phone number would you like to be contacted at? [phone]  How would you like to obtain your AVS? MyChart    Assessment & Plan     Encounter for counseling for travel  Presents requesting COVID-19 PCR testing prior to upcoming travel. Plans to depart for [place] on 9/18/2021 and needs a negative COVID-19 test within 72 hours of departure. He is asymptomatic.   - Asymptomatic COVID-19 Virus (Coronavirus) by PCR Nose; Future      11 minutes spent on the date of the encounter doing patient visit and documentation        FUTURE LABS:       - Schedule lab visit for today, 9/16/2021      FREDDIE Rodriguez CNP  [facility] PRADEEP Godoy is a 61 year old who presents for the following health issues:    HPI     Presents requesting COVID-19 PCR testing prior to upcoming travel. Plans to depart for [place] on 9/18/2021 and needs a negative COVID-19 test within 72 hours of departure. He is asymptomatic.           History reviewed. No pertinent past medical history.    Current Outpatient Medications   Medication     aspirin (ASA) 81 MG tablet     cetirizine (ZYRTEC) 10 MG tablet     fluticasone (FLONASE) 50 MCG/ACT nasal spray     olopatadine (PATANOL) 0.1 % ophthalmic solution     simvastatin (ZOCOR) 40 MG tablet     No current facility-administered medications for this visit.      No Known Allergies      Review of Systems    ROS: 10 point ROS neg other than the symptoms noted above in the HPI.        Objective       Vitals:  No vitals were obtained today due to virtual visit.    Physical Exam   healthy, alert and no distress  PSYCH: Alert and oriented times 3; coherent speech, normal   rate and volume, able to articulate logical thoughts, able   to abstract reason, no tangential thoughts, no hallucinations   or delusions  His affect is normal and pleasant  RESP: No cough, no audible wheezing, able to  talk in full sentences  Remainder of exam unable to be completed due to telephone visits          Phone call duration: 3 minutes

## 2021-09-17 LAB — SARS-COV-2 RNA RESP QL NAA+PROBE: NEGATIVE

## 2021-09-21 ENCOUNTER — DOCUMENTATION ONLY (OUTPATIENT)
Dept: SLEEP MEDICINE | Facility: CLINIC | Age: 61
End: 2021-09-21

## 2021-12-07 ENCOUNTER — TELEPHONE (OUTPATIENT)
Dept: SLEEP MEDICINE | Facility: CLINIC | Age: 61
End: 2021-12-07
Payer: COMMERCIAL

## 2021-12-07 NOTE — TELEPHONE ENCOUNTER
RETURNED PT CALL AND INFORMED THE PT WE REACHED OUT IN SEPT. PT STATED SHE WAS OUT OF TOWN. LET THE PT KNOW THAT SHE IS PLACED BACK ON THE WAIT LIST AND ONCE A MACHINE BECOMES AVAILABLE WILL CALL TO SCHEDULE APPT.

## 2022-02-02 ENCOUNTER — TELEPHONE (OUTPATIENT)
Dept: SLEEP MEDICINE | Facility: CLINIC | Age: 62
End: 2022-02-02
Payer: COMMERCIAL

## 2022-02-02 NOTE — TELEPHONE ENCOUNTER
You are scheduled for new PAP setup at [facility] on 2/8/22 at 1PM with IVON. Address for [place] showroom is [facility] [address], [address] [place] [id number] (located within [facility] Contact number for showroom is [phone], [facility] contact number is [phone]. Please call if you need to make any changes to your appointment. Please contact your insurance company prior to appointment to confirm your benefits for durable medical equipment.

## 2022-02-08 ENCOUNTER — DOCUMENTATION ONLY (OUTPATIENT)
Dept: SLEEP MEDICINE | Facility: CLINIC | Age: 62
End: 2022-02-08
Payer: COMMERCIAL

## 2022-02-11 ENCOUNTER — DOCUMENTATION ONLY (OUTPATIENT)
Dept: SLEEP MEDICINE | Facility: CLINIC | Age: 62
End: 2022-02-11
Payer: COMMERCIAL

## 2022-02-11 NOTE — PROGRESS NOTES
3 day Sleep therapy management telephone visit    Diagnostic AHI: 18.9  PSG    Confirmed with patient at time of call- Yes Patient is still interested in STM service       Subjective measures:  Patient has been breathing through his mouth and struggling with CPAP. Patient has minimal use.         Objective data     Order Settings for PAP  CPAP min 5    CPAP max 15     Assessment: Minimal usage      Action plan: Patient to have 14 day STM visit. Patient has a follow up visit scheduled:   no    Replacement device: No  STM ordered by provider: Yes     Total time spent on accessing and  interpreting remote patient PAP therapy data  10 minutes    Total time spent counseling, coaching  and reviewing PAP therapy data with patient  1 minutes    [id number] no

## 2022-02-23 ENCOUNTER — DOCUMENTATION ONLY (OUTPATIENT)
Dept: SLEEP MEDICINE | Facility: CLINIC | Age: 62
End: 2022-02-23
Payer: COMMERCIAL

## 2022-02-23 NOTE — PROGRESS NOTES
14  DAY STM VISIT    Diagnostic AHI: 18.9  PSG    Subjective measures:   Patient said he's having a hard time with the air pressure. He feels like he needs to open his mouth and then feels like he is suffocating. I adjusted the EPR from 2 to 3 and the response setting to soft. We talked about patient trying to desensitize by watching tv or reading while wearing mask. He agreed to try this.     Assessment: Pt not meeting objective benchmarks for compliance  Patient failing following subjective benchmarks: pressure issues    Action plan: pt to have 30 day STM visit.      Device type: Auto-CPAP    PAP settings: CPAP min 5.0 cm  H20       CPAP max 15.0 cm  H20      95th% pressure 5 cm  H20        RESMED EPR level Setting: TWO    RESMED Soft response setting:  OFF    Mask type:  Nasal Mask    Objective measures: 14 day rolling measures      Compliance  0 %      Leak  1.2  lpm  last  upload      AHI 0   last  upload      Average number of minutes 2      Objective measure goal  Compliance   Goal >70%  Leak   Goal < 24 lpm  AHI  Goal < 5  Usage  Goal >240        Total time spent on accessing and interpreting remote patient PAP therapy data  3 minutes    Total time spent counseling, coaching  and reviewing PAP therapy data with patient  5 minutes    [id number]  [id number]  no (3 day STM)

## 2022-03-11 ENCOUNTER — DOCUMENTATION ONLY (OUTPATIENT)
Dept: SLEEP MEDICINE | Facility: CLINIC | Age: 62
End: 2022-03-11
Payer: COMMERCIAL

## 2022-03-11 NOTE — PROGRESS NOTES
30 DAY STM VISIT    Diagnostic AHI: 18.9  PSG    Subjective measures:       Assessment: Pt meeting objective benchmarks.  Patient failing following subjective benchmarks: mask discomfort   Action plan: pt to have 6 month STM visit  Patient has scheduled a follow up visit with Dr. Rdz on 3/29/22.   Device type: Auto-CPAP  PAP settings: CPAP min 5.0 cm  H20     CPAP max 15.0 cm  H20    95th% pressure 13.3 cm  H20      RESMED EPR level Setting: TWO    RESMED Soft response setting:  OFF  Mask type:  Nasal Mask  Objective measures: 14 day rolling measures      Compliance  78 %      Leak  23.2 lpm  last  upload      AHI 0.82   last  upload      Average number of minutes 319      Objective measure goal  Compliance   Goal >70%  Leak   Goal < 24 lpm  AHI  Goal < 5  Usage  Goal >240        Total time spent on accessing and interpreting remote patient PAP therapy data  3 minutes    Total time spent counseling, coaching  and reviewing PAP therapy data with patient  5 minutes     [id number] this call  [id number] no  at 3 or 14 day [facility]

## 2022-03-29 ENCOUNTER — VIRTUAL VISIT (OUTPATIENT)
Dept: SLEEP MEDICINE | Facility: CLINIC | Age: 62
End: 2022-03-29
Payer: COMMERCIAL

## 2022-03-29 VITALS — WEIGHT: 146 LBS | BODY MASS INDEX: 23.46 KG/M2 | HEIGHT: 66 IN

## 2022-03-29 DIAGNOSIS — G47.33 OSA (OBSTRUCTIVE SLEEP APNEA): Primary | ICD-10-CM

## 2022-03-29 PROCEDURE — 99213 OFFICE O/P EST LOW 20 MIN: CPT | Mod: 95 | Performed by: INTERNAL MEDICINE

## 2022-03-29 ASSESSMENT — SLEEP AND FATIGUE QUESTIONNAIRES
HOW LIKELY ARE YOU TO NOD OFF OR FALL ASLEEP WHILE SITTING INACTIVE IN A PUBLIC PLACE: WOULD NEVER DOZE
HOW LIKELY ARE YOU TO NOD OFF OR FALL ASLEEP WHILE LYING DOWN TO REST IN THE AFTERNOON WHEN CIRCUMSTANCES PERMIT: WOULD NEVER DOZE
HOW LIKELY ARE YOU TO NOD OFF OR FALL ASLEEP WHILE SITTING AND TALKING TO SOMEONE: WOULD NEVER DOZE
HOW LIKELY ARE YOU TO NOD OFF OR FALL ASLEEP WHILE SITTING AND READING: WOULD NEVER DOZE
HOW LIKELY ARE YOU TO NOD OFF OR FALL ASLEEP WHEN YOU ARE A PASSENGER IN A CAR FOR AN HOUR WITHOUT A BREAK: WOULD NEVER DOZE
HOW LIKELY ARE YOU TO NOD OFF OR FALL ASLEEP IN A CAR, WHILE STOPPED FOR A FEW MINUTES IN TRAFFIC: WOULD NEVER DOZE
HOW LIKELY ARE YOU TO NOD OFF OR FALL ASLEEP WHILE SITTING QUIETLY AFTER LUNCH WITHOUT ALCOHOL: WOULD NEVER DOZE
HOW LIKELY ARE YOU TO NOD OFF OR FALL ASLEEP WHILE WATCHING TV: WOULD NEVER DOZE

## 2022-03-29 NOTE — PROGRESS NOTES
"Walt is a 61 year old who is being evaluated via a billable phone visit.      Phone Start Time: 11:32 AM    Phone End Time:11:39 AM       Obstructive Sleep Apnea - PAP Follow-Up Visit:    Chief Complaint   Patient presents with     Video Visit     CPAP follow-up       Walt Yin comes in today for follow-up of their moderate sleep apnea, managed with CPAP.     05/07/2021 [place] Diagnostic Sleep Study (146.0 lbs) - AHI 18.9, RDI 26.4, Supine AHI 27.7, REM AHI 9.2, Low O2 71.3%, Time Spent ?88% 5.9 minutes / Time Spent ?89% 7.6 minutes.    Overall, he rates the experience with PAP as ok. The mask is comfortable. The mask is not leaking.  He is not snoring with the mask on. He is not having gasp arousals.  He is not having significant oral/nasal dryness. The pressure settings are comfortable.     He uses nasal mask.     Bedtime is typically 10 PM. Usually it takes about 10 minutes to fall asleep with the mask on. Wake time is typically 7 AM.  Patient is using PAP therapy 7 hours per night. The patient is usually getting 8 hours of sleep per night.    He does feel rested in the morning.    Total score - [place]: 0 (3/29/2022 11:22 AM)    [employer]     Auto-PAP 5-15 cmH2O download:  26/30 total days of use. 4 nonuse days. 80% days with >4 hours use.  Average use 7 hours 14 minutes per day. Median Leak 1.1 L/min. 95%ile Leak 25.8 L/min. CPAP 95% pressure 13.6cm. AHI 0.8    Reviewed by team:  Tobacco  Allergies  Meds            Reviewed by provider:                 Problem List:  Patient Active Problem List    Diagnosis Date Noted     Prediabetes 02/08/2019     Priority: Medium     Hyperlipidemia LDL goal <160 02/08/2019     Priority: Medium          Ht 1.676 m (5' 6\")   Wt 66.2 kg (146 lb)   BMI 23.57 kg/m      Impression/Plan:     Moderate sleep apnea.     -  Tolerating PAP well. Daytime symptoms are improved..     - I reviewed download data and graphs from his device for last 30 days. Regular compliance " and normal AHI is demonstrated, confirming adequate treatment.     Plan:     - Continue auto PAP therapy     Walt Yin will follow up in about 1 year(s).     Juan Rdz MD    CC:  Junior Ge,

## 2022-03-29 NOTE — PATIENT INSTRUCTIONS
Your Body mass index is 23.57 kg/m .  Weight management is a personal decision.  If you are interested in exploring weight loss strategies, the following discussion covers the approaches that may be successful. Body mass index (BMI) is one way to tell whether you are at a healthy weight, overweight, or obese. It measures your weight in relation to your height.  A BMI of 18.5 to 24.9 is in the healthy range. A person with a BMI of 25 to 29.9 is considered overweight, and someone with a BMI of 30 or greater is considered obese. More than two-thirds of American adults are considered overweight or obese.  Being overweight or obese increases the risk for further weight gain. Excess weight may lead to heart disease and diabetes.  Creating and following plans for healthy eating and physical activity may help you improve your health.  Weight control is part of healthy lifestyle and includes exercise, emotional health, and healthy eating habits. Careful eating habits lifelong are the mainstay of weight control. Though there are significant health benefits from weight loss, long-term weight loss with diet alone may be very difficult to achieve- studies show long-term success with dietary management in less than 10% of people. Attaining a healthy weight may be especially difficult to achieve in those with severe obesity. In some cases, medications, devices and surgical management might be considered.  What can you do?  If you are overweight or obese and are interested in methods for weight loss, you should discuss this with your provider.     Consider reducing daily calorie intake by 500 calories.     Keep a food journal.     Avoiding skipping meals, consider cutting portions instead.    Diet combined with exercise helps maintain muscle while optimizing fat loss. Strength training is particularly important for building and maintaining muscle mass. Exercise helps reduce stress, increase energy, and improves fitness.  Increasing exercise without diet control, however, may not burn enough calories to loose weight.       Start walking three days a week 10-20 minutes at a time    Work towards walking thirty minutes five days a week     Eventually, increase the speed of your walking for 1-2 minutes at time    In addition, we recommend that you review healthy lifestyles and methods for weight loss available through the National Institutes of Health patient information sites:  http://win.niddk.nih.gov/publications/index.htm    And look into health and wellness programs that may be available through your health insurance provider, employer, local community center, or [employer].

## 2022-03-30 NOTE — NURSING NOTE
Return in about 1 year reminder created and to be send via [employer]. Durable medical equipment order routed to [facility].      KELSEY Ricketts  [facility] [place]

## 2022-05-21 ENCOUNTER — HEALTH MAINTENANCE LETTER (OUTPATIENT)
Age: 62
End: 2022-05-21

## 2022-06-08 ENCOUNTER — OFFICE VISIT (OUTPATIENT)
Dept: PEDIATRICS | Facility: CLINIC | Age: 62
End: 2022-06-08
Payer: COMMERCIAL

## 2022-06-08 VITALS
RESPIRATION RATE: 16 BRPM | TEMPERATURE: 97 F | SYSTOLIC BLOOD PRESSURE: 132 MMHG | BODY MASS INDEX: 25.62 KG/M2 | WEIGHT: 150.1 LBS | HEIGHT: 64 IN | OXYGEN SATURATION: 100 % | HEART RATE: 69 BPM | DIASTOLIC BLOOD PRESSURE: 80 MMHG

## 2022-06-08 DIAGNOSIS — G47.33 OSA (OBSTRUCTIVE SLEEP APNEA): ICD-10-CM

## 2022-06-08 DIAGNOSIS — E78.5 HYPERLIPIDEMIA LDL GOAL <160: ICD-10-CM

## 2022-06-08 DIAGNOSIS — H10.13 ALLERGIC CONJUNCTIVITIS, BILATERAL: ICD-10-CM

## 2022-06-08 DIAGNOSIS — Z23 NEED FOR COVID-19 VACCINE: ICD-10-CM

## 2022-06-08 DIAGNOSIS — M25.562 LEFT KNEE PAIN, UNSPECIFIED CHRONICITY: ICD-10-CM

## 2022-06-08 DIAGNOSIS — R73.03 PREDIABETES: ICD-10-CM

## 2022-06-08 DIAGNOSIS — J30.89 ENVIRONMENTAL AND SEASONAL ALLERGIES: ICD-10-CM

## 2022-06-08 DIAGNOSIS — Z00.00 ROUTINE GENERAL MEDICAL EXAMINATION AT A HEALTH CARE FACILITY: Primary | ICD-10-CM

## 2022-06-08 DIAGNOSIS — Z12.5 SCREENING FOR PROSTATE CANCER: ICD-10-CM

## 2022-06-08 LAB
ALBUMIN SERPL-MCNC: 3.9 G/DL (ref 3.4–5)
ALP SERPL-CCNC: 130 U/L (ref 40–150)
ALT SERPL W P-5'-P-CCNC: 25 U/L (ref 0–70)
ANION GAP SERPL CALCULATED.3IONS-SCNC: 4 MMOL/L (ref 3–14)
AST SERPL W P-5'-P-CCNC: 25 U/L (ref 0–45)
BILIRUB SERPL-MCNC: 0.5 MG/DL (ref 0.2–1.3)
BUN SERPL-MCNC: 9 MG/DL (ref 7–30)
CALCIUM SERPL-MCNC: 8.9 MG/DL (ref 8.5–10.1)
CHLORIDE BLD-SCNC: 106 MMOL/L (ref 94–109)
CHOLEST SERPL-MCNC: 155 MG/DL
CO2 SERPL-SCNC: 27 MMOL/L (ref 20–32)
CREAT SERPL-MCNC: 0.69 MG/DL (ref 0.66–1.25)
FASTING STATUS PATIENT QL REPORTED: YES
GFR SERPL CREATININE-BSD FRML MDRD: >90 ML/MIN/1.73M2
GLUCOSE BLD-MCNC: 126 MG/DL (ref 70–99)
HBA1C MFR BLD: 6.3 % (ref 0–5.6)
HDLC SERPL-MCNC: 86 MG/DL
LDLC SERPL CALC-MCNC: 61 MG/DL
NONHDLC SERPL-MCNC: 69 MG/DL
POTASSIUM BLD-SCNC: 4.7 MMOL/L (ref 3.4–5.3)
PROT SERPL-MCNC: 7.9 G/DL (ref 6.8–8.8)
PSA SERPL-MCNC: 0.53 UG/L (ref 0–4)
SODIUM SERPL-SCNC: 137 MMOL/L (ref 133–144)
TRIGL SERPL-MCNC: 41 MG/DL

## 2022-06-08 PROCEDURE — 99396 PREV VISIT EST AGE 40-64: CPT | Mod: 25 | Performed by: INTERNAL MEDICINE

## 2022-06-08 PROCEDURE — 80053 COMPREHEN METABOLIC PANEL: CPT | Performed by: INTERNAL MEDICINE

## 2022-06-08 PROCEDURE — 36415 COLL VENOUS BLD VENIPUNCTURE: CPT | Performed by: INTERNAL MEDICINE

## 2022-06-08 PROCEDURE — 83036 HEMOGLOBIN GLYCOSYLATED A1C: CPT | Performed by: INTERNAL MEDICINE

## 2022-06-08 PROCEDURE — G0103 PSA SCREENING: HCPCS | Performed by: INTERNAL MEDICINE

## 2022-06-08 PROCEDURE — 91305 COVID-19,PF,PFIZER (12+ YRS): CPT | Performed by: INTERNAL MEDICINE

## 2022-06-08 PROCEDURE — 0054A COVID-19,PF,PFIZER (12+ YRS): CPT | Performed by: INTERNAL MEDICINE

## 2022-06-08 PROCEDURE — 80061 LIPID PANEL: CPT | Performed by: INTERNAL MEDICINE

## 2022-06-08 RX ORDER — SIMVASTATIN 40 MG
TABLET ORAL
Qty: 90 TABLET | Refills: 3 | Status: SHIPPED | OUTPATIENT
Start: 2022-06-08 | End: 2023-08-09

## 2022-06-08 RX ORDER — OLOPATADINE HYDROCHLORIDE 1 MG/ML
1 SOLUTION/ DROPS OPHTHALMIC 2 TIMES DAILY
Qty: 5 ML | Refills: 3 | Status: SHIPPED | OUTPATIENT
Start: 2022-06-08 | End: 2023-08-09

## 2022-06-08 RX ORDER — FLUTICASONE PROPIONATE 50 MCG
SPRAY, SUSPENSION (ML) NASAL
Qty: 16 G | Refills: 3 | Status: SHIPPED | OUTPATIENT
Start: 2022-06-08 | End: 2024-06-04

## 2022-06-08 SDOH — ECONOMIC STABILITY: INCOME INSECURITY: HOW HARD IS IT FOR YOU TO PAY FOR THE VERY BASICS LIKE FOOD, HOUSING, MEDICAL CARE, AND HEATING?: NOT HARD AT ALL

## 2022-06-08 SDOH — ECONOMIC STABILITY: TRANSPORTATION INSECURITY
IN THE PAST 12 MONTHS, HAS THE LACK OF TRANSPORTATION KEPT YOU FROM MEDICAL APPOINTMENTS OR FROM GETTING MEDICATIONS?: NO

## 2022-06-08 SDOH — ECONOMIC STABILITY: TRANSPORTATION INSECURITY
IN THE PAST 12 MONTHS, HAS LACK OF TRANSPORTATION KEPT YOU FROM MEETINGS, WORK, OR FROM GETTING THINGS NEEDED FOR DAILY LIVING?: NO

## 2022-06-08 SDOH — ECONOMIC STABILITY: FOOD INSECURITY: WITHIN THE PAST 12 MONTHS, THE FOOD YOU BOUGHT JUST DIDN'T LAST AND YOU DIDN'T HAVE MONEY TO GET MORE.: NEVER TRUE

## 2022-06-08 SDOH — ECONOMIC STABILITY: INCOME INSECURITY: IN THE LAST 12 MONTHS, WAS THERE A TIME WHEN YOU WERE NOT ABLE TO PAY THE MORTGAGE OR RENT ON TIME?: NO

## 2022-06-08 SDOH — HEALTH STABILITY: PHYSICAL HEALTH: ON AVERAGE, HOW MANY MINUTES DO YOU ENGAGE IN EXERCISE AT THIS LEVEL?: 60 MIN

## 2022-06-08 SDOH — ECONOMIC STABILITY: FOOD INSECURITY: WITHIN THE PAST 12 MONTHS, YOU WORRIED THAT YOUR FOOD WOULD RUN OUT BEFORE YOU GOT MONEY TO BUY MORE.: NEVER TRUE

## 2022-06-08 SDOH — HEALTH STABILITY: PHYSICAL HEALTH
ON AVERAGE, HOW MANY DAYS PER WEEK DO YOU ENGAGE IN MODERATE TO STRENUOUS EXERCISE (LIKE A BRISK WALK)?: PATIENT DECLINED

## 2022-06-08 ASSESSMENT — LIFESTYLE VARIABLES
SKIP TO QUESTIONS 9-10: 0
HOW MANY STANDARD DRINKS CONTAINING ALCOHOL DO YOU HAVE ON A TYPICAL DAY: 3 OR 4
AUDIT-C TOTAL SCORE: 5
HOW OFTEN DO YOU HAVE SIX OR MORE DRINKS ON ONE OCCASION: NEVER
HOW OFTEN DO YOU HAVE A DRINK CONTAINING ALCOHOL: 4 OR MORE TIMES A WEEK

## 2022-06-08 ASSESSMENT — ENCOUNTER SYMPTOMS
WEAKNESS: 0
FREQUENCY: 0
PARESTHESIAS: 0
HEMATURIA: 0
EYE PAIN: 0
PALPITATIONS: 0
CHILLS: 0
HEARTBURN: 0
SHORTNESS OF BREATH: 0
HEADACHES: 0
HEMATOCHEZIA: 0
DIARRHEA: 0
COUGH: 0
CONSTIPATION: 0
NERVOUS/ANXIOUS: 0
SORE THROAT: 0
DYSURIA: 0
ARTHRALGIAS: 1
DIZZINESS: 0
NAUSEA: 0
MYALGIAS: 0
ABDOMINAL PAIN: 0
JOINT SWELLING: 0
FEVER: 0

## 2022-06-08 ASSESSMENT — SOCIAL DETERMINANTS OF HEALTH (SDOH)
HOW OFTEN DO YOU GET TOGETHER WITH FRIENDS OR RELATIVES?: ONCE A WEEK
DO YOU BELONG TO ANY CLUBS OR ORGANIZATIONS SUCH AS CHURCH GROUPS UNIONS, FRATERNAL OR ATHLETIC GROUPS, OR SCHOOL GROUPS?: NO
IN A TYPICAL WEEK, HOW MANY TIMES DO YOU TALK ON THE PHONE WITH FAMILY, FRIENDS, OR NEIGHBORS?: MORE THAN THREE TIMES A WEEK
HOW OFTEN DO YOU ATTEND CHURCH OR RELIGIOUS SERVICES?: MORE THAN 4 TIMES PER YEAR

## 2022-06-08 ASSESSMENT — PAIN SCALES - GENERAL: PAINLEVEL: NO PAIN (0)

## 2022-06-08 NOTE — PROGRESS NOTES
SUBJECTIVE:   CC: Walt Yin is an 62 year old male who presents for preventative health visit.   Patient has been advised of split billing requirements and indicates understanding: Yes     Healthy Habits:     Getting at least 3 servings of Calcium per day:  Yes    Bi-annual eye exam:  Yes    Dental care twice a year:  Yes    Sleep apnea or symptoms of sleep apnea:  Daytime drowsiness, Excessive snoring and Sleep apnea    Diet:  Regular (no restrictions)    Frequency of exercise:  None    Taking medications regularly:  Yes    Medication side effects:  Not applicable    PHQ-2 Total Score: 0    Additional concerns today:  No      Today's PHQ-2 Score:   PHQ-2 ( 1999 Pfizer) 6/8/2022   Q1: Little interest or pleasure in doing things 0   Q2: Feeling down, depressed or hopeless 0   PHQ-2 Score 0   PHQ-2 Total Score (12-17 Years)- Positive if 3 or more points; Administer PHQ-A if positive -   Q1: Little interest or pleasure in doing things Not at all   Q2: Feeling down, depressed or hopeless Not at all   PHQ-2 Score 0     Abuse: Current or Past(Physical, Sexual or Emotional)- No  Do you feel safe in your environment? Yes      Social History     Tobacco Use     Smoking status: Never Smoker     Smokeless tobacco: Current User   Substance Use Topics     Alcohol use: Yes     Comment: Social         Alcohol Use 6/8/2022   Prescreen: >3 drinks/day or >7 drinks/week? Yes   Prescreen: >3 drinks/day or >7 drinks/week? -   AUDIT SCORE  9       Last PSA:   PSA   Date Value Ref Range Status   03/14/2020 0.40 0 - 4 ug/L Final     Comment:     Assay Method:  Chemiluminescence using Siemens Vista analyzer       Reviewed orders with patient. Reviewed health maintenance and updated orders accordingly - Yes  Patient Active Problem List   Diagnosis     Prediabetes     Hyperlipidemia LDL goal <160     ZENIA (obstructive sleep apnea)     History reviewed. No pertinent surgical history.    Social History     Tobacco Use     Smoking status:  "Never Smoker     Smokeless tobacco: Current User   Substance Use Topics     Alcohol use: Yes     Comment: Social     History reviewed. No pertinent family history.      Current Outpatient Medications   Medication Sig Dispense Refill     aspirin (ASA) 81 MG tablet Take 81 mg by mouth       cetirizine (ZYRTEC) 10 MG tablet Take 1 tablet (10 mg) by mouth daily as needed for allergies 90 tablet 1     fluticasone (FLONASE) 50 MCG/ACT nasal spray USE 2 SPRAYS INTO EACH NOSTRIL DAILY 16 g 3     olopatadine (PATANOL) 0.1 % ophthalmic solution Place 1 drop into both eyes 2 times daily 5 mL 3     simvastatin (ZOCOR) 40 MG tablet TAKE ONE TABLET BY MOUTH EVERY NIGHT AT BEDTIME 90 tablet 3       Reviewed and updated as needed this visit by clinical staff   Tobacco  Allergies  Meds   Med Hx  Surg Hx  Fam Hx  Soc Hx          Reviewed and updated as needed this visit by Provider      Review of Systems   Constitutional: Negative for chills and fever.   HENT: Negative for congestion, ear pain, hearing loss and sore throat.    Eyes: Negative for pain and visual disturbance.   Respiratory: Negative for cough and shortness of breath.    Cardiovascular: Negative for chest pain, palpitations and peripheral edema.   Gastrointestinal: Negative for abdominal pain, constipation, diarrhea, heartburn, hematochezia and nausea.   Genitourinary: Negative for dysuria, frequency, genital sores, hematuria, impotence, penile discharge and urgency.   Musculoskeletal: Positive for arthralgias. Negative for joint swelling and myalgias.   Skin: Negative for rash.   Neurological: Negative for dizziness, weakness, headaches and paresthesias.   Psychiatric/Behavioral: Negative for mood changes. The patient is not nervous/anxious.          OBJECTIVE:   /80 (BP Location: Right arm, Patient Position: Sitting, Cuff Size: Adult Regular)   Pulse 69   Temp 97  F (36.1  C) (Tympanic)   Resp 16   Ht 1.626 m (5' 4\")   Wt 68.1 kg (150 lb 1.6 oz)   " SpO2 100%   BMI 25.76 kg/m      Physical Exam  GENERAL: healthy, alert and no distress  EYES: Eyes grossly normal to inspection, PERRL and conjunctivae and sclerae normal  HENT: ear canals and TM's normal, nose and mouth without ulcers or lesions  NECK: no adenopathy, no asymmetry, masses, or scars and thyroid normal to palpation  RESP: lungs clear to auscultation - no rales, rhonchi or wheezes  CV: regular rate and rhythm, normal S1 S2, no S3 or S4, no murmur, click or rub, no peripheral edema and peripheral pulses strong  ABDOMEN: soft, nontender, no hepatosplenomegaly, no masses and bowel sounds normal  MS: no gross musculoskeletal defects noted, no edema     Knee exam: normal exam, no swelling, tenderness, instability; ligaments intact, FROM.   SKIN: no suspicious lesions or rashes  NEURO: Normal strength and tone, mentation intact and speech normal  PSYCH: mentation appears normal, affect normal/bright    ASSESSMENT/PLAN:   (Z00.00) Routine general medical examination at a health care facility  (primary encounter diagnosis)    (R73.03) Prediabetes  Comment:   Plan: Hemoglobin A1c          (E78.5) Hyperlipidemia LDL goal <160  Comment:   Plan: Lipid panel reflex to direct LDL Fasting,         simvastatin (ZOCOR) 40 MG tablet, Comprehensive        metabolic panel (BMP + Alb, Alk Phos, ALT, AST,        Total. Bili, TP)          (M25.562) Left knee pain, unspecified chronicity  Comment: normal exam, recommend curse of PT and follow-up if not improving  Plan: Physical Therapy Referral          (G47.33) ZENIA (obstructive sleep apnea)  Comment:   Plan: continue CPAP    (J30.89) Environmental and seasonal allergies  Comment:   Plan: fluticasone (FLONASE) 50 MCG/ACT nasal spray          (H10.13) Allergic conjunctivitis, bilateral  Comment:   Plan: olopatadine (PATANOL) 0.1 % ophthalmic solution          (Z23) Need for COVID-19 vaccine  Comment:   Plan: COVID-19,PF,PFIZER (12+ Yrs GRAY LABEL)          (Z12.5) Screening  "for prostate cancer  Comment:   Plan: PSA, screen           COUNSELING:   Reviewed preventive health counseling, as reflected in patient instructions       Regular exercise       Healthy diet/nutrition       Colorectal cancer screening       Prostate cancer screening    Estimated body mass index is 25.76 kg/m  as calculated from the following:    Height as of this encounter: 1.626 m (5' 4\").    Weight as of this encounter: 68.1 kg (150 lb 1.6 oz).         He reports that he has never smoked. He uses smokeless tobacco.      Counseling Resources:  ATP IV Guidelines  Pooled Cohorts Equation Calculator  FRAX Risk Assessment  ICSI Preventive Guidelines  Dietary Guidelines for Americans, 2010  USDA's MyPlate  ASA Prophylaxis  Lung CA Screening    Junior Ge MD  [facility]  "

## 2022-06-08 NOTE — LETTER
June 14, 2022      Walt Yin  [address] [place] [address]        Dear ,    We are writing to inform you of your test results.      Regarding the labs from your most recent visit:     Your high cholesterol is well-controlled on the current medication.   Your labwork is consistent with Prediabetes, defined as a fasting blood sugar between 100 and 125. Prediabetes puts you at risk for Adult Onset (type 2) Diabetes.  You can prevent or delay the development of Type 2 Diabetes through lifestyle changes including modest weight loss and regular exercise.  Reducing your weight through daily calorie restriction and modest physical activity for 30 minutes every day can often return elevated blood glucose levels to the normal range.   [facility] offers group class sessions for those newly diagnosed with prediabetes. Diabetes educators will lead discussions and provide you with helpful tips and tools for living well and reducing your risk of developing diabetes.   Pre-diabetes classes are community education classes and do not require a referral.   For questions or more information regarding Pre-diabetes education classes, call the Diabetes Education Team at [phone] ([place] locations) or [phone] ([facility]).       Resulted Orders   Lipid panel reflex to direct LDL Fasting   Result Value Ref Range    Cholesterol 155 <200 mg/dL    Triglycerides 41 <150 mg/dL    Direct Measure HDL 86 >=40 mg/dL    LDL Cholesterol Calculated 61 <=100 mg/dL    Non HDL Cholesterol 69 <130 mg/dL    Patient Fasting > 8hrs? Yes     Narrative    Cholesterol  Desirable:  <200 mg/dL    Triglycerides  Normal:  Less than 150 mg/dL  Borderline High:  150-199 mg/dL  High:  200-499 mg/dL  Very High:  Greater than or equal to 500 mg/dL    Direct Measure HDL  Female:  Greater than or equal to 50 mg/dL   Male:  Greater than or equal to 40 mg/dL    LDL Cholesterol  Desirable:  <100mg/dL  Above Desirable:   100-129 mg/dL   Borderline High:  130-159 mg/dL   High:  160-189 mg/dL   Very High:  >= 190 mg/dL    Non HDL Cholesterol  Desirable:  130 mg/dL  Above Desirable:  130-159 mg/dL  Borderline High:  160-189 mg/dL  High:  190-219 mg/dL  Very High:  Greater than or equal to 220 mg/dL   Comprehensive metabolic panel (BMP + Alb, Alk Phos, ALT, AST, Total. Bili, TP)   Result Value Ref Range    Sodium 137 133 - 144 mmol/L    Potassium 4.7 3.4 - 5.3 mmol/L    Chloride 106 94 - 109 mmol/L    Carbon Dioxide (CO2) 27 20 - 32 mmol/L    Anion Gap 4 3 - 14 mmol/L    Urea Nitrogen 9 7 - 30 mg/dL    Creatinine 0.69 0.66 - 1.25 mg/dL    Calcium 8.9 8.5 - 10.1 mg/dL    Glucose 126 (H) 70 - 99 mg/dL    Alkaline Phosphatase 130 40 - 150 U/L    AST 25 0 - 45 U/L    ALT 25 0 - 70 U/L    Protein Total 7.9 6.8 - 8.8 g/dL    Albumin 3.9 3.4 - 5.0 g/dL    Bilirubin Total 0.5 0.2 - 1.3 mg/dL    GFR Estimate >90 >60 mL/min/1.73m2      Comment:      Effective December 21, 2021 eGFRcr in adults is calculated using the 2021 CKD-EPI creatinine equation which includes age and gender (Charo et al., NEJ, DOI: 10.1056/WJPXct6783389)   PSA, screen   Result Value Ref Range    Prostate Specific Antigen Screen 0.53 0.00 - 4.00 ug/L   Hemoglobin A1c   Result Value Ref Range    Hemoglobin A1C 6.3 (H) 0.0 - 5.6 %      Comment:      Normal <5.7%   Prediabetes 5.7-6.4%    Diabetes 6.5% or higher     Note: Adopted from ADA consensus guidelines.       If you have any questions or concerns, please call the clinic at the number listed above.       Sincerely,      Junior Ge MD

## 2022-06-28 ENCOUNTER — THERAPY VISIT (OUTPATIENT)
Dept: PHYSICAL THERAPY | Facility: CLINIC | Age: 62
End: 2022-06-28
Attending: INTERNAL MEDICINE
Payer: COMMERCIAL

## 2022-06-28 DIAGNOSIS — M25.562 LEFT KNEE PAIN, UNSPECIFIED CHRONICITY: ICD-10-CM

## 2022-06-28 PROCEDURE — 97161 PT EVAL LOW COMPLEX 20 MIN: CPT | Mod: GP | Performed by: PHYSICAL THERAPIST

## 2022-06-28 PROCEDURE — 97110 THERAPEUTIC EXERCISES: CPT | Mod: GP | Performed by: PHYSICAL THERAPIST

## 2022-06-28 NOTE — PROGRESS NOTES
[facility]    OUTPATIENT Physical Therapy ORTHOPEDIC EVALUATION  PLAN OF TREATMENT FOR OUTPATIENT REHABILITATION  (COMPLETE FOR INITIAL CLAIMS ONLY)  Patient's Last Name, First Name, M.I.  YOB: 1960  Walt Yin    Provider s Name:  [facility]   Medical Record No.  [id number]   Start of Care Date:  06/28/22   Onset Date:   06/08/22   Type:     _X__PT   ___OT Medical Diagnosis:    Encounter Diagnosis   Name Primary?    Left knee pain, unspecified chronicity         Treatment Diagnosis:  L knee pain        Goals:     06/28/22 0500   Body Part   Goals listed below are for L knee pain   Goal #1   Goal #1 stairs   Previous Functional Level No restrictions   Current Functional Level Ascend/descend stairs   Performance Level PL 10/10   STG Target Performance Ascend/descend stairs   Performance Level PL 5/10   Rationale for safe community access to buildings   Due Date 07/26/22   LTG Target Performance Ascend/descend stairs   Performance Level PL 0/10   Rationale for safe community ambulaton;to enter/leave the house safely   Due Date 08/23/22       Therapy Frequency:  1x/week  Predicted Duration of Therapy Intervention:  6 week    Piyush Orr PT                 I CERTIFY THE NEED FOR THESE SERVICES FURNISHED UNDER        THIS PLAN OF TREATMENT AND WHILE UNDER MY CARE     (Physician attestation of this document indicates review and certification of the therapy plan).                     Certification Date From:  06/28/22   Certification Date To:  08/27/22    Referring Provider:  Junior Ge    Initial Assessment        See Epic Evaluation SOC Date: 06/28/22

## 2022-06-28 NOTE — PROGRESS NOTES
Physical Therapy Initial Evaluation  Subjective:  The history is provided by the patient. No  was used.   Patient Health History  Walt Yin being seen for L knee pain.     Problem began: 3/1/2022.   Problem occurred: Insidioius onset of L knee pain, pt noticed fluid in L knee.     Pain is reported as 10/10 on pain scale.  General health as reported by patient is good.  Pertinent medical history includes: none.   Red flags:  None as reported by patient.         Current medications:  None.    Current occupation is [employer].                     Therapist Generated HPI Evaluation         Type of problem:  Left knee.          Patient reports pain:  Anterior, in the joint, lateral, posterior and medial.  Pain is described as sharp and is intermittent.  Pain is the same all the time.  Since onset symptoms are gradually worsening.  Associated symptoms:  Loss of motion/stiffness and loss of strength. Symptoms are exacerbated by ascending stairs, descending stairs and bending/squatting  and relieved by rest.      Restrictions due to condition include:  Working in normal job without restrictions.  Barriers include:  None as reported by patient.                        Objective:  System                                           Hip Evaluation    Hip Strength:      Extension:  Left: 4/5  Pain:Right: 4+/5    Pain:    Abduction:  Left: 4/5     Pain:Right: 4+/5    Pain:                           Knee Evaluation:  ROM:  AROM: normal    AROM      Extension:  Left: -1    Right:  -1          Strength:     Extension:  Left: 5-/5   Pain:              Ligament Testing:  Normal                Special Tests:   Left knee positive for the following special tests:  Patellar Compression          Functional Testing:          Quad:    Single Leg Squat:  Left:      Excessive anterior knee excursion and moderate loss of control  Right:        Bilateral Leg Squat:   Excessive anterior knee excursion               General     ROS    Assessment/Plan:    Patient is a 62 year old male with left side knee complaints.    Patient has the following significant findings with corresponding treatment plan.                Diagnosis 1:  L knee pain      Pain -  hot/cold therapy, manual therapy, self management, education and home program  Decreased strength - therapeutic exercise and therapeutic activities  Impaired muscle performance - neuro re-education  Decreased function - therapeutic activities    Therapy Evaluation Codes:   1) History comprised of:   Personal factors that impact the plan of care:      None.    Comorbidity factors that impact the plan of care are:      None.     Medications impacting care: None.  2) Examination of Body Systems comprised of:   Body structures and functions that impact the plan of care:      Knee.   Activity limitations that impact the plan of care are:      Sitting, Squatting/kneeling and Stairs.  3) Clinical presentation characteristics are:   Evolving/Changing.  4) Decision-Making    Low complexity using standardized patient assessment instrument and/or measureable assessment of functional outcome.  Cumulative Therapy Evaluation is: Low complexity.    Previous and current functional limitations:  (See Goal Flow Sheet for this information)    Short term and Long term goals: (See Goal Flow Sheet for this information)     Communication ability:  Patient appears to be able to clearly communicate and understand verbal and written communication and follow directions correctly.  Treatment Explanation - The following has been discussed with the patient:   RX ordered/plan of care  Anticipated outcomes  Possible risks and side effects  This patient would benefit from PT intervention to resume normal activities.   Rehab potential is good.    Frequency:  1 X week, once daily  Duration:  for 6 weeks  Discharge Plan:  Achieve all LTG.  Independent in home treatment program.  Reach maximal therapeutic  benefit.    Please refer to the daily flowsheet for treatment today, total treatment time and time spent performing 1:1 timed codes.

## 2022-07-14 ENCOUNTER — THERAPY VISIT (OUTPATIENT)
Dept: PHYSICAL THERAPY | Facility: CLINIC | Age: 62
End: 2022-07-14
Payer: COMMERCIAL

## 2022-07-14 DIAGNOSIS — M25.562 LEFT KNEE PAIN, UNSPECIFIED CHRONICITY: Primary | ICD-10-CM

## 2022-07-14 PROCEDURE — 97110 THERAPEUTIC EXERCISES: CPT | Mod: GP | Performed by: PHYSICAL THERAPIST

## 2022-07-19 ENCOUNTER — THERAPY VISIT (OUTPATIENT)
Dept: PHYSICAL THERAPY | Facility: CLINIC | Age: 62
End: 2022-07-19
Payer: COMMERCIAL

## 2022-07-19 DIAGNOSIS — M25.562 LEFT KNEE PAIN, UNSPECIFIED CHRONICITY: Primary | ICD-10-CM

## 2022-07-19 PROCEDURE — 97110 THERAPEUTIC EXERCISES: CPT | Mod: GP | Performed by: PHYSICAL THERAPIST

## 2022-09-18 ENCOUNTER — HEALTH MAINTENANCE LETTER (OUTPATIENT)
Age: 62
End: 2022-09-18

## 2022-09-20 NOTE — PROGRESS NOTES
Discharge Note    Progress reporting period is from initial evaluation date (please see noted date below) to Jul 19, 2022.  Linked Episodes   Type: Episode: Status: Noted: Resolved: Last update: Updated by:   PHYSICAL THERAPY L knee 6/28/2022 Active 6/28/2022 7/19/2022 10:52 AM Piyush Orr PT      Comments:       Walt failed to follow up and current status is unknown.  Please see information below for last relevant information on current status.  Patient seen for 3 visits.    SUBJECTIVE  Subjective changes noted by patient:  Pt is improving after 5 days of doing HEP  .  Current pain level is  .     Previous pain level was   .   Changes in function:  Yes (See Goal flowsheet attached for changes in current functional level)  Adverse reaction to treatment or activity: None    OBJECTIVE  Changes noted in objective findings:       ASSESSMENT/PLAN  Diagnosis: L knee pain   STG/LTGs have been met or progress has been made towards goals:  Yes, please see goal flowsheet for most current information  Assessment of Progress: current status is unknown.    Last current status:     Self Management Plans:  HEP  I have re-evaluated this patient and find that the nature, scope, duration and intensity of the therapy is appropriate for the medical condition of the patient.  Walt continues to require the following intervention to meet STG and LTG's:  HEP.    Recommendations:  Discharge with current home program.  Patient to follow up with MD as needed.    Please refer to the daily flowsheet for treatment today, total treatment time and time spent performing 1:1 timed codes.

## 2022-11-15 ENCOUNTER — OFFICE VISIT (OUTPATIENT)
Dept: FAMILY MEDICINE | Facility: CLINIC | Age: 62
End: 2022-11-15
Payer: COMMERCIAL

## 2022-11-15 VITALS
HEIGHT: 64 IN | DIASTOLIC BLOOD PRESSURE: 72 MMHG | BODY MASS INDEX: 25.76 KG/M2 | HEART RATE: 85 BPM | SYSTOLIC BLOOD PRESSURE: 152 MMHG | TEMPERATURE: 98.5 F | RESPIRATION RATE: 12 BRPM | WEIGHT: 150.9 LBS | OXYGEN SATURATION: 100 %

## 2022-11-15 DIAGNOSIS — Z23 NEED FOR HEPATITIS A IMMUNIZATION: ICD-10-CM

## 2022-11-15 DIAGNOSIS — Z71.84 ENCOUNTER FOR COUNSELING FOR TRAVEL: Primary | ICD-10-CM

## 2022-11-15 DIAGNOSIS — Z23 NEED FOR PROPHYLACTIC VACCINATION AND INOCULATION AGAINST INFLUENZA: ICD-10-CM

## 2022-11-15 PROCEDURE — 99401 PREV MED CNSL INDIV APPRX 15: CPT | Mod: 25 | Performed by: PHYSICIAN ASSISTANT

## 2022-11-15 PROCEDURE — 90472 IMMUNIZATION ADMIN EACH ADD: CPT | Performed by: PHYSICIAN ASSISTANT

## 2022-11-15 PROCEDURE — 90471 IMMUNIZATION ADMIN: CPT | Performed by: PHYSICIAN ASSISTANT

## 2022-11-15 PROCEDURE — 90632 HEPA VACCINE ADULT IM: CPT | Performed by: PHYSICIAN ASSISTANT

## 2022-11-15 PROCEDURE — 90682 RIV4 VACC RECOMBINANT DNA IM: CPT | Performed by: PHYSICIAN ASSISTANT

## 2022-11-15 RX ORDER — MEFLOQUINE HYDROCHLORIDE 250 MG/1
TABLET ORAL
Qty: 14 TABLET | Refills: 0 | Status: SHIPPED | OUTPATIENT
Start: 2022-11-15 | End: 2023-08-22

## 2022-11-15 RX ORDER — AZITHROMYCIN 500 MG/1
TABLET, FILM COATED ORAL
Qty: 6 TABLET | Refills: 0 | Status: SHIPPED | OUTPATIENT
Start: 2022-11-15 | End: 2023-08-22

## 2022-11-15 NOTE — PROGRESS NOTES
SUBJECTIVE: Walt Yin , a 62 year old  male, presents for counseling and information regarding upcoming travel to [place]. Special medical concerns include: none. He anticipates the following unusual exposures: none.    Itinerary:  [place]    Departure Date: 12/12/22 Return date: 1/31/22    Reason for travel (i.e. Business, pleasure): pleasure    Visiting an urban or rural area?: urban    Accommodations (i.e. hotel, hostel, friends, family, etc): friends, family, hotel    Women - First day of your last period: n/a    IMMUNIZATION HISTORY  Have you received any vaccinations in the past 4 weeks?  No  Have you ever fainted from having your blood drawn or from an injection?  No  Have you ever had a fever reaction to vaccination?  YES  Have you ever had any bad reaction or side effect from any vaccination?  No  Have you ever had hepatitis A or B vaccine?  No  Do you live (or work closely) with anyone who has AIDS, an AIDS-like condition, any other immune disorder or who is on chemotherapy for cancer?  No  Have you received any injection of immune globulin or any blood products during the past 12 months?  No    GENERAL MEDICAL HISTORY  Do you have a medical condition that warrants maintenance medication or physician follow-up?  No  Do you have a medical condition that is stable now, but that may recur while traveling?  No  Has your spleen been removed?  No  Have you had an acute illness or a fever in the past 48 hours?  No  Are you pregnant, or might you become pregnant on this trip?  Any chance of pregnancy?  No  Are you breastfeeding?  No  Do you have HIV, AIDS, an AIDS-like condition, any other immune disorder, leukemia or cancer?  No  Do you have a severe combined immunodeficiency disease?  No  Have you had your thymus gland removed or history of problems with your thymus, such as myasthenia gravis, DiGeorge syndrome, or thymoma?  No    Do you have severe thrombocytopenia (low platelet count) or a coagulation  disorder?  No  Have you ever had a convulsion, seizure, epilepsy, neurologic condition or brain infection?  No  Do you have any stomach conditions?  No  Do you have a G6PD deficiency?  No  Do you have severe renal or kidney impairment?  No  Do you have a history of psychiatric problems?  No  Do you have a problem with strange dreams and/or nightmares?  No  Do you have insomnia?  No  Do you have problems with vaginitis?  No  Do you have psoriasis?  No  Are you prone to motion sickness?  No  Have you ever had headaches, nausea, vomiting, or breathing problems from altitude exposure?  No      History reviewed. No pertinent past medical history.   Immunization History   Administered Date(s) Administered     COVID-19,PF,Moderna 03/16/2021, 04/14/2021     COVID-19,PF,Pfizer (12+ Yrs) 12/15/2021     COVID-19,PF,Pfizer 12+ Yrs (2022 and After) 06/08/2022     FLU 6-35 months 11/22/2005, 10/23/2006, 10/23/2007, 10/20/2008, 11/10/2009, 09/21/2010     Influenza (IIV3) PF 10/01/2006, 10/23/2007, 10/20/2008, 11/10/2009, 08/31/2011, 08/28/2013     Influenza (intradermal) 09/28/2012     Influenza Quad, Recombinant, pf(RIV4) (Flublok) 09/11/2019, 10/01/2020, 09/16/2021     Influenza Vaccine IM > 6 months Valent IIV4 (Alfuria,Fluzone) 09/09/2015, 11/14/2017     Tdap (Adult) Unspecified Formulation 06/25/2018     Zoster vaccine recombinant adjuvanted (SHINGRIX) 03/12/2020, 06/01/2020       Current Outpatient Medications   Medication Sig Dispense Refill     aspirin (ASA) 81 MG tablet Take 81 mg by mouth       cetirizine (ZYRTEC) 10 MG tablet Take 1 tablet (10 mg) by mouth daily as needed for allergies 90 tablet 1     fluticasone (FLONASE) 50 MCG/ACT nasal spray USE 2 SPRAYS INTO EACH NOSTRIL DAILY 16 g 3     olopatadine (PATANOL) 0.1 % ophthalmic solution Place 1 drop into both eyes 2 times daily 5 mL 3     simvastatin (ZOCOR) 40 MG tablet TAKE ONE TABLET BY MOUTH EVERY NIGHT AT BEDTIME 90 tablet 3     No Known Allergies     EXAM:  deferred    Immunizations discussed include: Hepatitis A and Typhoid  Malaria prophylaxis recommended: mefloquine  Symptomatic treatment for traveler's diarrhea: bismuth subsalicylate, loperamide/diphenoxylate and azithromycin    ASSESSMENT/PLAN:    (Z71.84) Encounter for counseling for travel  (primary encounter diagnosis)    Comment: Hepatitis A and oral typhoid vaccines today. Patient will return or follow-up with PCP as needed. Prophylaxis given for Traveler's diarrhea and Malaria. All questions were answered.     Plan: typhoid (VIVOTIF) CR capsule, mefloquine         (LARIAM) 250 MG tablet, azithromycin         (ZITHROMAX) 500 MG tablet            (Z23) Need for hepatitis A immunization  Comment:   Plan: HEPATITIS A VACCINE (ADULT)            (Z23) Need for prophylactic vaccination and inoculation against influenza  Comment:   Plan: INFLUENZA QUAD, RECOMBINANT, P-FREE (RIV4)         (FLUBLOK)              I have reviewed general recommendations for safe travel   including: food/water precautions, insect avoidance, safe sex   practices given high prevalence of HIV and other STDs,   roadway safety. Educational materials and links to the CDC   Traveler's health website have been provided.    Total time 15 minutes, greater than 50 percent in counseling   and coordination of care.

## 2023-05-16 ENCOUNTER — TELEPHONE (OUTPATIENT)
Dept: SLEEP MEDICINE | Facility: CLINIC | Age: 63
End: 2023-05-16
Payer: COMMERCIAL

## 2023-05-16 DIAGNOSIS — G47.33 OSA (OBSTRUCTIVE SLEEP APNEA): Primary | ICD-10-CM

## 2023-05-16 NOTE — TELEPHONE ENCOUNTER
Order/Referral Request    Who is requesting: Patient     Orders being requested: C-pap supplies    Reason service is needed/diagnosis: prescription is     When are orders needed by: ASAP    Has this been discussed with Provider: Yes    Does patient have a preference on a Group/Provider/Facility? [facility]    Does patient have an appointment scheduled?: Yes: 2023    Where to send orders: Place orders within Epic    Could we send this information to you in [employer] or would you prefer to receive a phone call?:   Patient would like to be contacted via [employer]

## 2023-05-16 NOTE — TELEPHONE ENCOUNTER
Last ov 3/9/22  Next ov 8/22/23    Patient requesting updated order for CPAP supplies prior to appointment. Order pended and routed to provider for consideration.    Penny WYATT RN  [facility]

## 2023-07-30 ENCOUNTER — HEALTH MAINTENANCE LETTER (OUTPATIENT)
Age: 63
End: 2023-07-30

## 2023-08-09 DIAGNOSIS — H10.13 ALLERGIC CONJUNCTIVITIS, BILATERAL: ICD-10-CM

## 2023-08-09 DIAGNOSIS — E78.5 HYPERLIPIDEMIA LDL GOAL <160: ICD-10-CM

## 2023-08-10 RX ORDER — SIMVASTATIN 40 MG
TABLET ORAL
Qty: 90 TABLET | Refills: 0 | Status: SHIPPED | OUTPATIENT
Start: 2023-08-10 | End: 2023-09-20

## 2023-08-10 RX ORDER — OLOPATADINE HYDROCHLORIDE 1 MG/ML
1 SOLUTION/ DROPS OPHTHALMIC 2 TIMES DAILY
Qty: 5 ML | Refills: 0 | Status: SHIPPED | OUTPATIENT
Start: 2023-08-10 | End: 2023-09-20

## 2023-08-10 NOTE — TELEPHONE ENCOUNTER
Refills extended until visit  Prescription approved per [facility] Refill Protocol.  Stella Ramirez RN, BSN  [facility]

## 2023-08-22 ENCOUNTER — OFFICE VISIT (OUTPATIENT)
Dept: SLEEP MEDICINE | Facility: CLINIC | Age: 63
End: 2023-08-22
Payer: COMMERCIAL

## 2023-08-22 VITALS
BODY MASS INDEX: 25.01 KG/M2 | WEIGHT: 155.6 LBS | HEART RATE: 86 BPM | OXYGEN SATURATION: 98 % | HEIGHT: 66 IN | SYSTOLIC BLOOD PRESSURE: 146 MMHG | DIASTOLIC BLOOD PRESSURE: 78 MMHG

## 2023-08-22 DIAGNOSIS — G47.33 OSA (OBSTRUCTIVE SLEEP APNEA): Primary | ICD-10-CM

## 2023-08-22 PROCEDURE — 99213 OFFICE O/P EST LOW 20 MIN: CPT | Performed by: INTERNAL MEDICINE

## 2023-08-22 ASSESSMENT — SLEEP AND FATIGUE QUESTIONNAIRES
HOW LIKELY ARE YOU TO NOD OFF OR FALL ASLEEP WHILE WATCHING TV: HIGH CHANCE OF DOZING
HOW LIKELY ARE YOU TO NOD OFF OR FALL ASLEEP IN A CAR, WHILE STOPPED FOR A FEW MINUTES IN TRAFFIC: WOULD NEVER DOZE
HOW LIKELY ARE YOU TO NOD OFF OR FALL ASLEEP WHILE LYING DOWN TO REST IN THE AFTERNOON WHEN CIRCUMSTANCES PERMIT: WOULD NEVER DOZE
HOW LIKELY ARE YOU TO NOD OFF OR FALL ASLEEP WHILE SITTING AND READING: HIGH CHANCE OF DOZING
HOW LIKELY ARE YOU TO NOD OFF OR FALL ASLEEP WHEN YOU ARE A PASSENGER IN A CAR FOR AN HOUR WITHOUT A BREAK: WOULD NEVER DOZE
HOW LIKELY ARE YOU TO NOD OFF OR FALL ASLEEP WHILE SITTING AND TALKING TO SOMEONE: WOULD NEVER DOZE
HOW LIKELY ARE YOU TO NOD OFF OR FALL ASLEEP WHILE SITTING QUIETLY AFTER LUNCH WITHOUT ALCOHOL: WOULD NEVER DOZE
HOW LIKELY ARE YOU TO NOD OFF OR FALL ASLEEP WHILE SITTING INACTIVE IN A PUBLIC PLACE: HIGH CHANCE OF DOZING

## 2023-08-22 NOTE — PROGRESS NOTES
Obstructive Sleep Apnea - PAP Follow-Up Visit:    Chief Complaint   Patient presents with    CPAP Follow Up     CPAP follow up       Walt Yin comes in today for follow-up of their moderate sleep apnea, managed with CPAP.     05/07/2021 [place] [facility] Sleep Study (146.0 lbs) - AHI 18.9, RDI 26.4, Supine AHI 27.7, REM AHI 9.2, Low O2 71.3%, Time Spent ?88% 5.9 minutes / Time Spent ?89% 7.6 minutes.     Do you use a CPAP Machine at home: Yes  Overall, on a scale of 0-10 how would you rate your CPAP (0 poor, 10 great): 10  Is your mask comfortable: Yes  If not, why:    Is you mask leaking: Yes  If yes, where do you feel it: left side  How many night per week does the mask leak (0-7): 7  Do you notice snoring with mask on: No  Do you notice gasping arousals with mask on: No  Are you having significant oral or nasal dryness: Yes  Is the pressure setting comfortable: Yes  In not, why:    What type of mask do you use: Nasal Mask  What is your typical bedtime: 12  How long does it take you to go to sleep on PAP therapy: 5  What time do you typically get out of bed for the day: 9  How many hours on average per night are you using PAP therapy: 9  How many hours are you sleeping per night: 9  Do you feel well rested in the morning: Yes    EPWORTH SLEEPINESS SCALE         8/22/2023     3:15 PM    [place] Sleepiness Scale ( RONNI Stock  [id number]<br>ESS - [place]/English - Final version - 21 Nov 07 - [facility] [place].)   Sitting and reading High chance of dozing   Watching TV High chance of dozing   Sitting, inactive in a public place (e.g. a theatre or a meeting) High chance of dozing   As a passenger in a car for an hour without a break Would never doze   Lying down to rest in the afternoon when circumstances permit Would never doze   Sitting and talking to someone Would never doze   Sitting quietly after a lunch without alcohol Would never doze   In a car, while stopped for a few minutes in traffic Would never  "doze   [place] Score (MC) 9   [place] Score (Sleep) 9       INSOMNIA SEVERITY INDEX (KATIA)          8/22/2023     3:11 PM   Insomnia Severity Index (KATIA)   Difficulty falling asleep 0   Difficulty staying asleep 0   Problems waking up too early 0   How SATISFIED/DISSATISFIED are you with your CURRENT sleep pattern? 0   How NOTICEABLE to others do you think your sleep problem is in terms of impairing the quality of your life? 0   How WORRIED/DISTRESSED are you about your current sleep problem? 0   To what extent do you consider your sleep problem to INTERFERE with your daily functioning (e.g. daytime fatigue, mood, ability to function at work/daily chores, concentration, memory, mood, etc.) CURRENTLY? 0   KATIA Total Score 0       Guidelines for Scoring/Interpretation:  Total score categories:  0-7 = No clinically significant insomnia   8-14 = Subthreshold insomnia   15-21 = Clinical insomnia (moderate severity)  22-28 = Clinical insomnia (severe)  Used via courtesy of www.Sour[place].va.gov with permission from Sree Harrison PhD., [facility]    [employer]     Auto-PAP 5.0 - 15.0 cmH2O 30 day usage data:    66% of days with > 4 hours of use. 5/30 days with no use.   Average use 319 minutes per day.   95%ile Leak 31.48 L/min.   CPAP 95% pressure 12.1 cm.   AHI 1.05 events per hour.     BP (!) 146/94   Pulse 86   Ht 1.676 m (5' 5.98\")   Wt 70.6 kg (155 lb 9.6 oz)   SpO2 98%   BMI 25.13 kg/m      Impression/Plan:     Moderate obstructive sleep apnea.     -Patient is using CPAP regularly and continues to benefit from therapy.  I reviewed download data and graphs from his device for the last 30 days. Normal residual AHI is demonstrated on current therapy settings, indicating adequacy of treatment settings.    Plan:    Continue auto PAP therapy 5-15 cm H2O    I spent a total of 20 minutes for this appointment on this date of service which include time spent before, during and after the visit for chart review, patient " care, counseling and coordination of care.      Juan Rdz MD    CC:  Junior Ge,

## 2023-08-22 NOTE — PATIENT INSTRUCTIONS
Your There is no height or weight on file to calculate BMI.  Weight management is a personal decision.  If you are interested in exploring weight loss strategies, the following discussion covers the approaches that may be successful. Body mass index (BMI) is one way to tell whether you are at a healthy weight, overweight, or obese. It measures your weight in relation to your height.  A BMI of 18.5 to 24.9 is in the healthy range. A person with a BMI of 25 to 29.9 is considered overweight, and someone with a BMI of 30 or greater is considered obese. More than two-thirds of American adults are considered overweight or obese.  Being overweight or obese increases the risk for further weight gain. Excess weight may lead to heart disease and diabetes.  Creating and following plans for healthy eating and physical activity may help you improve your health.  Weight control is part of healthy lifestyle and includes exercise, emotional health, and healthy eating habits. Careful eating habits lifelong are the mainstay of weight control. Though there are significant health benefits from weight loss, long-term weight loss with diet alone may be very difficult to achieve- studies show long-term success with dietary management in less than 10% of people. Attaining a healthy weight may be especially difficult to achieve in those with severe obesity. In some cases, medications, devices and surgical management might be considered.  What can you do?  If you are overweight or obese and are interested in methods for weight loss, you should discuss this with your provider.   Consider reducing daily calorie intake by 500 calories.   Keep a food journal.   Avoiding skipping meals, consider cutting portions instead.    Diet combined with exercise helps maintain muscle while optimizing fat loss. Strength training is particularly important for building and maintaining muscle mass. Exercise helps reduce stress, increase energy, and improves  fitness. Increasing exercise without diet control, however, may not burn enough calories to loose weight.     Start walking three days a week 10-20 minutes at a time  Work towards walking thirty minutes five days a week   Eventually, increase the speed of your walking for 1-2 minutes at time    In addition, we recommend that you review healthy lifestyles and methods for weight loss available through the National Institutes of Health patient information sites:  http://win.niddk.nih.gov/publications/index.htm    And look into health and wellness programs that may be available through your health insurance provider, employer, local community center, or [employer].

## 2023-08-22 NOTE — NURSING NOTE
"Chief Complaint   Patient presents with    CPAP Follow Up     CPAP follow up       Initial BP (!) 146/94   Pulse 86   Ht 1.676 m (5' 5.98\")   Wt 70.6 kg (155 lb 9.6 oz)   SpO2 98%   BMI 25.13 kg/m   Estimated body mass index is 25.13 kg/m  as calculated from the following:    Height as of this encounter: 1.676 m (5' 5.98\").    Weight as of this encounter: 70.6 kg (155 lb 9.6 oz).    Medication Reconciliation: complete  ESS 9  Carmine vAalos MA         "

## 2023-09-20 ENCOUNTER — OFFICE VISIT (OUTPATIENT)
Dept: PEDIATRICS | Facility: CLINIC | Age: 63
End: 2023-09-20
Payer: COMMERCIAL

## 2023-09-20 VITALS
HEART RATE: 73 BPM | SYSTOLIC BLOOD PRESSURE: 110 MMHG | RESPIRATION RATE: 16 BRPM | DIASTOLIC BLOOD PRESSURE: 72 MMHG | WEIGHT: 151 LBS | BODY MASS INDEX: 25.16 KG/M2 | HEIGHT: 65 IN | TEMPERATURE: 97.2 F | OXYGEN SATURATION: 100 %

## 2023-09-20 DIAGNOSIS — H10.13 ALLERGIC CONJUNCTIVITIS, BILATERAL: ICD-10-CM

## 2023-09-20 DIAGNOSIS — Z00.00 ROUTINE GENERAL MEDICAL EXAMINATION AT A HEALTH CARE FACILITY: Primary | ICD-10-CM

## 2023-09-20 DIAGNOSIS — J30.2 SEASONAL ALLERGIC RHINITIS, UNSPECIFIED TRIGGER: ICD-10-CM

## 2023-09-20 DIAGNOSIS — E78.5 HYPERLIPIDEMIA LDL GOAL <160: ICD-10-CM

## 2023-09-20 DIAGNOSIS — Z12.5 SCREENING FOR PROSTATE CANCER: ICD-10-CM

## 2023-09-20 DIAGNOSIS — M25.562 CHRONIC PAIN OF LEFT KNEE: ICD-10-CM

## 2023-09-20 DIAGNOSIS — G89.29 CHRONIC PAIN OF LEFT KNEE: ICD-10-CM

## 2023-09-20 DIAGNOSIS — R73.03 PREDIABETES: ICD-10-CM

## 2023-09-20 DIAGNOSIS — G47.33 OSA (OBSTRUCTIVE SLEEP APNEA): ICD-10-CM

## 2023-09-20 LAB
ALBUMIN SERPL BCG-MCNC: 4.7 G/DL (ref 3.5–5.2)
ALP SERPL-CCNC: 129 U/L (ref 40–129)
ALT SERPL W P-5'-P-CCNC: 18 U/L (ref 0–70)
ANION GAP SERPL CALCULATED.3IONS-SCNC: 9 MMOL/L (ref 7–15)
AST SERPL W P-5'-P-CCNC: 24 U/L (ref 0–45)
BILIRUB SERPL-MCNC: 0.6 MG/DL
BUN SERPL-MCNC: 7.7 MG/DL (ref 8–23)
CALCIUM SERPL-MCNC: 9.5 MG/DL (ref 8.8–10.2)
CHLORIDE SERPL-SCNC: 101 MMOL/L (ref 98–107)
CHOLEST SERPL-MCNC: 155 MG/DL
CREAT SERPL-MCNC: 0.73 MG/DL (ref 0.67–1.17)
DEPRECATED HCO3 PLAS-SCNC: 28 MMOL/L (ref 22–29)
EGFRCR SERPLBLD CKD-EPI 2021: >90 ML/MIN/1.73M2
GLUCOSE SERPL-MCNC: 126 MG/DL (ref 70–99)
HBA1C MFR BLD: 6.4 % (ref 0–5.6)
HDLC SERPL-MCNC: 77 MG/DL
LDLC SERPL CALC-MCNC: 67 MG/DL
NONHDLC SERPL-MCNC: 78 MG/DL
POTASSIUM SERPL-SCNC: 5 MMOL/L (ref 3.4–5.3)
PROT SERPL-MCNC: 7.9 G/DL (ref 6.4–8.3)
PSA SERPL DL<=0.01 NG/ML-MCNC: 0.36 NG/ML (ref 0–4.5)
SODIUM SERPL-SCNC: 138 MMOL/L (ref 136–145)
TRIGL SERPL-MCNC: 54 MG/DL

## 2023-09-20 PROCEDURE — 99396 PREV VISIT EST AGE 40-64: CPT | Performed by: INTERNAL MEDICINE

## 2023-09-20 PROCEDURE — G0103 PSA SCREENING: HCPCS | Performed by: INTERNAL MEDICINE

## 2023-09-20 PROCEDURE — 99213 OFFICE O/P EST LOW 20 MIN: CPT | Mod: 25 | Performed by: INTERNAL MEDICINE

## 2023-09-20 PROCEDURE — 83036 HEMOGLOBIN GLYCOSYLATED A1C: CPT | Performed by: INTERNAL MEDICINE

## 2023-09-20 PROCEDURE — 80053 COMPREHEN METABOLIC PANEL: CPT | Performed by: INTERNAL MEDICINE

## 2023-09-20 PROCEDURE — 80061 LIPID PANEL: CPT | Performed by: INTERNAL MEDICINE

## 2023-09-20 PROCEDURE — 36415 COLL VENOUS BLD VENIPUNCTURE: CPT | Performed by: INTERNAL MEDICINE

## 2023-09-20 RX ORDER — CETIRIZINE HYDROCHLORIDE 10 MG/1
10 TABLET ORAL DAILY PRN
Qty: 90 TABLET | Refills: 11 | Status: SHIPPED | OUTPATIENT
Start: 2023-09-20

## 2023-09-20 RX ORDER — OLOPATADINE HYDROCHLORIDE 1 MG/ML
1 SOLUTION/ DROPS OPHTHALMIC 2 TIMES DAILY
Qty: 5 ML | Refills: 11 | Status: SHIPPED | OUTPATIENT
Start: 2023-09-20 | End: 2024-09-23

## 2023-09-20 RX ORDER — SIMVASTATIN 40 MG
TABLET ORAL
Qty: 90 TABLET | Refills: 11 | Status: SHIPPED | OUTPATIENT
Start: 2023-09-20 | End: 2024-09-23

## 2023-09-20 SDOH — HEALTH STABILITY: PHYSICAL HEALTH: ON AVERAGE, HOW MANY MINUTES DO YOU ENGAGE IN EXERCISE AT THIS LEVEL?: PATIENT DECLINED

## 2023-09-20 ASSESSMENT — ENCOUNTER SYMPTOMS
HEARTBURN: 0
DIZZINESS: 0
NERVOUS/ANXIOUS: 0
SORE THROAT: 0
ABDOMINAL PAIN: 0
DIARRHEA: 0
ARTHRALGIAS: 1
PALPITATIONS: 0
PARESTHESIAS: 0
FEVER: 0
CONSTIPATION: 0
MYALGIAS: 0
HEMATOCHEZIA: 0
WEAKNESS: 0
DYSURIA: 0
HEADACHES: 0
CHILLS: 0
EYE PAIN: 0
FREQUENCY: 0
NAUSEA: 0
SHORTNESS OF BREATH: 0
HEMATURIA: 0
COUGH: 0
JOINT SWELLING: 1

## 2023-09-20 ASSESSMENT — LIFESTYLE VARIABLES
HOW OFTEN DO YOU HAVE A DRINK CONTAINING ALCOHOL: PATIENT DECLINED
HOW OFTEN DO YOU HAVE SIX OR MORE DRINKS ON ONE OCCASION: PATIENT DECLINED
SKIP TO QUESTIONS 9-10: 0
AUDIT-C TOTAL SCORE: -1
HOW MANY STANDARD DRINKS CONTAINING ALCOHOL DO YOU HAVE ON A TYPICAL DAY: PATIENT DECLINED

## 2023-09-20 ASSESSMENT — SOCIAL DETERMINANTS OF HEALTH (SDOH)
DO YOU BELONG TO ANY CLUBS OR ORGANIZATIONS SUCH AS CHURCH GROUPS UNIONS, FRATERNAL OR ATHLETIC GROUPS, OR SCHOOL GROUPS?: PATIENT DECLINED
IN A TYPICAL WEEK, HOW MANY TIMES DO YOU TALK ON THE PHONE WITH FAMILY, FRIENDS, OR NEIGHBORS?: PATIENT DECLINED
HOW OFTEN DO YOU GET TOGETHER WITH FRIENDS OR RELATIVES?: PATIENT DECLINED
HOW OFTEN DO YOU ATTENT MEETINGS OF THE CLUB OR ORGANIZATION YOU BELONG TO?: PATIENT DECLINED
ARE YOU MARRIED, WIDOWED, DIVORCED, SEPARATED, NEVER MARRIED, OR LIVING WITH A PARTNER?: PATIENT DECLINED
HOW OFTEN DO YOU ATTEND CHURCH OR RELIGIOUS SERVICES?: PATIENT DECLINED

## 2023-09-20 ASSESSMENT — PAIN SCALES - GENERAL: PAINLEVEL: MILD PAIN (2)

## 2023-09-20 NOTE — PROGRESS NOTES
SUBJECTIVE:   CC: Walt is an 63 year old who presents for preventative health visit.       9/20/2023     7:58 AM   Additional Questions   Roomed by hilda   Accompanied by self         9/20/2023     7:58 AM   Patient Reported Additional Medications   Patient reports taking the following new medications no       Healthy Habits:     Getting at least 3 servings of Calcium per day:  Yes    Bi-annual eye exam:  NO    Dental care twice a year:  Yes    Sleep apnea or symptoms of sleep apnea:  None    Diet:  Regular (no restrictions)    Frequency of exercise:  1 day/week    Duration of exercise:  45-60 minutes    Taking medications regularly:  Yes    Medication side effects:  Not applicable    Additional concerns today:  No      Today's PHQ-2 Score:       9/20/2023     8:02 AM   PHQ-2 ( 1999 [employer])   Q1: Little interest or pleasure in doing things 0   Q2: Feeling down, depressed or hopeless 0   PHQ-2 Score 0   Q1: Little interest or pleasure in doing things Not at all   Q2: Feeling down, depressed or hopeless Not at all   PHQ-2 Score 0         Social History     Tobacco Use    Smoking status: Never    Smokeless tobacco: Current   Substance Use Topics    Alcohol use: Yes     Comment: Social             9/20/2023     8:02 AM   Alcohol Use   Prescreen: >3 drinks/day or >7 drinks/week? Yes   AUDIT SCORE  4       Last PSA:   PSA   Date Value Ref Range Status   03/14/2020 0.40 0 - 4 ug/L Final     Comment:     Assay Method:  Chemiluminescence using Siemens Vista analyzer     Prostate Specific Antigen Screen   Date Value Ref Range Status   06/08/2022 0.53 0.00 - 4.00 ug/L Final       Reviewed orders with patient. Reviewed health maintenance and updated orders accordingly -       Reviewed and updated as needed this visit by clinical staff   Tobacco  Allergies  Meds              Reviewed and updated as needed this visit by Provider                   Patient Active Problem List   Diagnosis    Prediabetes    Hyperlipidemia LDL  "goal <160    ZENIA (obstructive sleep apnea)     Current Outpatient Medications   Medication Sig Dispense Refill    aspirin (ASA) 81 MG tablet Take 81 mg by mouth      cetirizine (ZYRTEC) 10 MG tablet Take 1 tablet (10 mg) by mouth daily as needed for allergies 90 tablet 11    fluticasone (FLONASE) 50 MCG/ACT nasal spray USE 2 SPRAYS INTO EACH NOSTRIL DAILY 16 g 3    olopatadine (PATANOL) 0.1 % ophthalmic solution Place 1 drop into both eyes 2 times daily 5 mL 11    simvastatin (ZOCOR) 40 MG tablet TAKE ONE TABLET BY MOUTH EVERY NIGHT AT BEDTIME 90 tablet 11        Review of Systems   Constitutional:  Negative for chills and fever.   HENT:  Negative for congestion, ear pain, hearing loss and sore throat.    Eyes:  Negative for pain and visual disturbance.   Respiratory:  Negative for cough and shortness of breath.    Cardiovascular:  Negative for chest pain, palpitations and peripheral edema.   Gastrointestinal:  Negative for abdominal pain, constipation, diarrhea, heartburn, hematochezia and nausea.   Genitourinary:  Negative for dysuria, frequency, genital sores, hematuria, impotence, penile discharge and urgency.   Musculoskeletal:  Positive for arthralgias and joint swelling. Negative for myalgias.   Skin:  Negative for rash.   Neurological:  Negative for dizziness, weakness, headaches and paresthesias.   Psychiatric/Behavioral:  Negative for mood changes. The patient is not nervous/anxious.          OBJECTIVE:   /72 (Cuff Size: Adult Regular)   Pulse 73   Temp 97.2  F (36.2  C) (Tympanic)   Resp 16   Ht 1.651 m (5' 5\")   Wt 68.5 kg (151 lb)   SpO2 100%   BMI 25.13 kg/m      Physical Exam  GENERAL: healthy, alert and no distress  EYES: Eyes grossly normal to inspection, PERRL and conjunctivae and sclerae normal  HENT: ear canals and TM's normal, nose and mouth without ulcers or lesions  NECK: no adenopathy, no asymmetry, masses, or scars and thyroid normal to palpation  RESP: lungs clear to " auscultation - no rales, rhonchi or wheezes  CV: regular rate and rhythm, normal S1 S2, no S3 or S4, no murmur, click or rub, no peripheral edema and peripheral pulses strong  ABDOMEN: soft, nontender, no hepatosplenomegaly, no masses and bowel sounds normal  MS: left Knee exam: normal exam, no swelling, instability; ligaments intact, FROM.  Tenderness over distal hamstring  SKIN: no suspicious lesions or rashes  NEURO: Normal strength and tone, mentation intact and speech normal  PSYCH: mentation appears normal, affect normal/bright    ASSESSMENT/PLAN:   (Z00.00) Routine general medical examination at a health care facility  (primary encounter diagnosis)    (R73.03) Prediabetes  Comment:   Plan: Hemoglobin A1c          (E78.5) Hyperlipidemia LDL goal <160  Comment: Adequate control.  Continue zocor  Lab Results   Component Value Date    LDL 61 06/08/2022    LDL 66 04/21/2021    Plan: simvastatin (ZOCOR) 40 MG tablet, Comprehensive        metabolic panel (BMP + Alb, Alk Phos, ALT, AST,        Total. Bili, TP), Lipid panel reflex to direct         LDL Fasting          (G47.33) ZENIA (obstructive sleep apnea)  Comment:   Plan: continue cpap    (H10.13) Allergic conjunctivitis, bilateral  Comment:   Plan: olopatadine (PATANOL) 0.1 % ophthalmic solution          (J30.2) Seasonal allergic rhinitis, unspecified trigger  Comment:   Plan: cetirizine (ZYRTEC) 10 MG tablet          (Z12.5) Screening for prostate cancer  Comment:   Plan: PSA, screen          (M25.562,  G89.29) Chronic pain of left knee  Comment: sx more than a year, previously saw [facility].   Recommend course of PT, prefers to see orthopedics  Plan: Orthopedic  Referral              COUNSELING:   Reviewed preventive health counseling, as reflected in patient instructions       Regular exercise       Healthy diet/nutrition       Colorectal cancer screening       Prostate cancer screening      BMI:   Estimated body mass index is 25.13 kg/m  as calculated from  "the following:    Height as of this encounter: 1.651 m (5' 5\").    Weight as of this encounter: 68.5 kg (151 lb).         He reports that he has never smoked. He uses smokeless tobacco.            Junior Ge MD  [facility]  "

## 2023-10-19 ENCOUNTER — OFFICE VISIT (OUTPATIENT)
Dept: ORTHOPEDICS | Facility: CLINIC | Age: 63
End: 2023-10-19
Attending: INTERNAL MEDICINE
Payer: COMMERCIAL

## 2023-10-19 ENCOUNTER — ANCILLARY PROCEDURE (OUTPATIENT)
Dept: GENERAL RADIOLOGY | Facility: CLINIC | Age: 63
End: 2023-10-19
Attending: FAMILY MEDICINE
Payer: COMMERCIAL

## 2023-10-19 VITALS
SYSTOLIC BLOOD PRESSURE: 123 MMHG | HEIGHT: 66 IN | DIASTOLIC BLOOD PRESSURE: 83 MMHG | WEIGHT: 151 LBS | BODY MASS INDEX: 24.27 KG/M2

## 2023-10-19 DIAGNOSIS — G89.29 CHRONIC PAIN OF LEFT KNEE: Primary | ICD-10-CM

## 2023-10-19 DIAGNOSIS — M25.562 CHRONIC PAIN OF LEFT KNEE: ICD-10-CM

## 2023-10-19 DIAGNOSIS — M25.562 CHRONIC PAIN OF LEFT KNEE: Primary | ICD-10-CM

## 2023-10-19 DIAGNOSIS — G89.29 CHRONIC PAIN OF LEFT KNEE: ICD-10-CM

## 2023-10-19 PROCEDURE — 73562 X-RAY EXAM OF KNEE 3: CPT | Mod: TC | Performed by: RADIOLOGY

## 2023-10-19 PROCEDURE — 99203 OFFICE O/P NEW LOW 30 MIN: CPT | Performed by: FAMILY MEDICINE

## 2023-10-19 NOTE — PROGRESS NOTES
CHIEF COMPLAINT:  Pain of the Left Knee       HISTORY OF PRESENT ILLNESS  Mr. Yin is a pleasant 63 year old year old male who presents to clinic today with left knee pain.  Walt explains that his knee has been hurting for the last 2 years without injury or trauma. The pain is mainly in the back of the knee and hurts when getting up from sitting.     Patient is also having pain in his right ankle that has been going on for about a month. There was no injury or trauma and it is most painful if he is inverting. It doesn't hurt all the time but does feel unstable.     Onset: gradual  Location: left knee  Quality:  aching, dull, and sharp  Duration: 2 years   Severity: 8/10 at worst  Timing:constant but gets worse with certain movements  Modifying factors:  resting and non-use makes it better, movement and use makes it worse  Associated signs & symptoms: pain  Previous similar pain: No  Treatments to date: physical therapy with [facility] icing     Additional history: as documented    Review of Systems:   10-point review of systems was obtained and is negative except for as noted in the HPI.     MEDICAL HISTORY  Patient Active Problem List   Diagnosis    Prediabetes    Hyperlipidemia LDL goal <160    ZENIA (obstructive sleep apnea)       Current Outpatient Medications   Medication Sig Dispense Refill    aspirin (ASA) 81 MG tablet Take 81 mg by mouth      cetirizine (ZYRTEC) 10 MG tablet Take 1 tablet (10 mg) by mouth daily as needed for allergies 90 tablet 11    fluticasone (FLONASE) 50 MCG/ACT nasal spray USE 2 SPRAYS INTO EACH NOSTRIL DAILY 16 g 3    olopatadine (PATANOL) 0.1 % ophthalmic solution Place 1 drop into both eyes 2 times daily 5 mL 11    simvastatin (ZOCOR) 40 MG tablet TAKE ONE TABLET BY MOUTH EVERY NIGHT AT BEDTIME 90 tablet 11       No Known Allergies    No family history on file.    Additional medical/Social/Surgical histories reviewed in [facility] and updated as appropriate.       PHYSICAL EXAM  There were no  vitals taken for this visit.    General  - normal appearance, in no obvious distress  Musculoskeletal - left knee  - stance: normal gait without limp  - inspection: no effusion  - palpation: posterior and posterolateral joint line tenderness  - ROM: 120 degrees flexion, -5 degrees extension, pain at terminal flexion passively  - strength: 5/5 in flexion, 5/5 in extension  - special tests:  (-) Lachman  (-) anterior drawer  (+) Jimi  (-) varus at 0 and 30 degrees flexion  (-) valgus at 0 and 30 degrees flexion  Neuro  - no sensory or motor deficit, grossly normal coordination, normal muscle tone    IMAGING : Left knee xr 3 views. Final results and radiologist's interpretation, available in the [facility] health record. Images were reviewed with the patient/family members in the office today. My personal interpretation of the performed imaging is no significant DJD of left knee.     ASSESSMENT & PLAN  Mr. Yin is a 63 year old year old male who presents to clinic today with chronic left knee pain worsening over the past 2 years.    XR very reassuring and no lateral or medial compartment DJD in regions of pain.  Concern for meniscal or soft tissue pathology. Recalcitrant to NSAIDs, PT with [facility] Day, and relative rest.    Diagnosis: Chronic pain of left knee    -MRI left knee without contrast  -Continue activity as tolerated  -Follow up after MRI, consideration for surgical vs. Nonsurgical options after this MRI completed    It was a pleasure seeing Walt today.    Cedric Lopez DO, [facility]  Primary Care Sports Medicine  \

## 2023-10-19 NOTE — LETTER
10/19/2023         RE: Walt Yin  [address] [place] [address]        Dear Colleague,    Thank you for referring your patient, Walt Yin, to the [facility] [place]. Please see a copy of my visit note below.    CHIEF COMPLAINT:  Pain of the Left Knee       HISTORY OF PRESENT ILLNESS  Mr. Yin is a pleasant 63 year old year old male who presents to clinic today with left knee pain.  Walt explains that his knee has been hurting for the last 2 years without injury or trauma. The pain is mainly in the back of the knee and hurts when getting up from sitting.     Patient is also having pain in his right ankle that has been going on for about a month. There was no injury or trauma and it is most painful if he is inverting. It doesn't hurt all the time but does feel unstable.     Onset: gradual  Location: left knee  Quality:  aching, dull, and sharp  Duration: 2 years   Severity: 8/10 at worst  Timing:constant but gets worse with certain movements  Modifying factors:  resting and non-use makes it better, movement and use makes it worse  Associated signs & symptoms: pain  Previous similar pain: No  Treatments to date: physical therapy with [facility], icing     Additional history: as documented    Review of Systems:   10-point review of systems was obtained and is negative except for as noted in the HPI.     MEDICAL HISTORY  Patient Active Problem List   Diagnosis     Prediabetes     Hyperlipidemia LDL goal <160     ZENIA (obstructive sleep apnea)       Current Outpatient Medications   Medication Sig Dispense Refill     aspirin (ASA) 81 MG tablet Take 81 mg by mouth       cetirizine (ZYRTEC) 10 MG tablet Take 1 tablet (10 mg) by mouth daily as needed for allergies 90 tablet 11     fluticasone (FLONASE) 50 MCG/ACT nasal spray USE 2 SPRAYS INTO EACH NOSTRIL DAILY 16 g 3     olopatadine (PATANOL) 0.1 % ophthalmic solution Place 1 drop into both eyes 2 times daily 5 mL 11      simvastatin (ZOCOR) 40 MG tablet TAKE ONE TABLET BY MOUTH EVERY NIGHT AT BEDTIME 90 tablet 11       No Known Allergies    No family history on file.    Additional medical/Social/Surgical histories reviewed in [facility] and updated as appropriate.       PHYSICAL EXAM  There were no vitals taken for this visit.    General  - normal appearance, in no obvious distress  Musculoskeletal - left knee  - stance: normal gait without limp  - inspection: no effusion  - palpation: posterior and posterolateral joint line tenderness  - ROM: 120 degrees flexion, -5 degrees extension, pain at terminal flexion passively  - strength: 5/5 in flexion, 5/5 in extension  - special tests:  (-) Lachman  (-) anterior drawer  (+) Jimi  (-) varus at 0 and 30 degrees flexion  (-) valgus at 0 and 30 degrees flexion  Neuro  - no sensory or motor deficit, grossly normal coordination, normal muscle tone    IMAGING : Left knee xr 3 views. Final results and radiologist's interpretation, available in the [facility] health record. Images were reviewed with the patient/family members in the office today. My personal interpretation of the performed imaging is no significant DJD of left knee.     ASSESSMENT & PLAN  Mr. Yin is a 63 year old year old male who presents to clinic today with chronic left knee pain worsening over the past 2 years.    XR very reassuring and no lateral or medial compartment DJD in regions of pain.  Concern for meniscal or soft tissue pathology. Recalcitrant to NSAIDs, PT with Christopher Day, and relative rest.    Diagnosis: Chronic pain of left knee    -MRI left knee without contrast  -Continue activity as tolerated  -Follow up after MRI, consideration for surgical vs. Nonsurgical options after this MRI completed    It was a pleasure seeing Walt today.    Cedric Lopez DO, [facility]  Primary Care Sports Medicine  \      Again, thank you for allowing me to participate in the care of your patient.        Sincerely,        Cedric PETTIT  Jessica, DO   full range of motion in all extremities

## 2023-10-19 NOTE — PATIENT INSTRUCTIONS
Thank you for choosing [facility]    [facility] CLIN[facility] LOCATIONS  [place] [address] [address] [facility], [address]th [place]   [place], [place], [address] [place] [address]   [phone]       APPOINTMENTS: [phone]    CARE QUESTIONS: [phone],    BILLING QUESTIONS: [phone]    FAX NUMBER: [phone]        Follow up: After the MRI to discuss the results       1. Chronic pain of left knee        An order for an MRI was placed today. You may call directly to schedule at [phone] at your convenience.

## 2023-10-23 ENCOUNTER — ANCILLARY PROCEDURE (OUTPATIENT)
Dept: MRI IMAGING | Facility: CLINIC | Age: 63
End: 2023-10-23
Attending: FAMILY MEDICINE
Payer: COMMERCIAL

## 2023-10-23 DIAGNOSIS — G89.29 CHRONIC PAIN OF LEFT KNEE: ICD-10-CM

## 2023-10-23 DIAGNOSIS — M25.562 CHRONIC PAIN OF LEFT KNEE: ICD-10-CM

## 2023-10-23 PROCEDURE — 73721 MRI JNT OF LWR EXTRE W/O DYE: CPT | Mod: LT

## 2023-11-13 NOTE — PROGRESS NOTES
ESTABLISHED PATIENT FOLLOW-UP:  Pain of the Left Knee       HISTORY OF PRESENT ILLNESS  Mr. Yin is a pleasant 63 year old year old male who presents to clinic today for follow-up of L knee pain.     Date of injury: chronic  Date last seen: 10/19/23  Following Therapeutic Plan: Continue activity as tolerated; MRI ordered  Pain: Getting worse with walking and with L knee flexion  Function: Does have to go downstairs  one leg at a time due to painful L knee flexion  Interval History: MRI completed 10/23/23; completed PT; is not doing HEP due to pain    Additional medical/Social/Surgical histories reviewed in [facility] and updated as appropriate.    REVIEW OF SYSTEMS (11/13/2023)  CONSTITUTIONAL: Denies fever and weight loss  GASTROINTESTINAL: Denies abdominal pain, nausea, vomiting  MUSCULOSKELETAL: See HPI  SKIN: Denies any recent rash or lesion  NEUROLOGICAL: Denies numbness or focal weakness     PHYSICAL EXAM  There were no vitals taken for this visit.    General  - normal appearance, in no obvious distress  Musculoskeletal - left knee  - stance: normal gait without limp  - inspection: no effusion  - palpation: posterior and posterolateral joint line tenderness  - ROM: 120 degrees flexion, -5 degrees extension, pain at terminal flexion passively  - strength: 5/5 in flexion, 5/5 in extension  Neuro  - no sensory or motor deficit, grossly normal coordination, normal muscle tone    IMAGING :     EXAM: MR KNEE LEFT W/O CONTRAST  LOCATION: [facility]  DATE: 10/23/2023     INDICATION: Chronic pain of left knee.  COMPARISON: 10/19/2023.  TECHNIQUE: Unenhanced.                                                                   IMPRESSION:  1.  Complex horizontal and free margin radial tear medial meniscus.  2.  Mild to moderate medial and patellofemoral compartment cartilage loss.   3.  Intrasubstance degeneration anterior horn lateral meniscus without a discrete tear. Mild lateral compartment  cartilage loss.  4.  Moderate degeneration and intrasubstance cystic change proximal fibers of the ACL. No tear.  5.  Small joint effusion.     ASSESSMENT & PLAN  Mr. Yin is a 63 year old year old male who presents to clinic today for follow-up regarding chronic left knee pain.    MRIs today revealing tricompartmental degenerative changes in the mild to moderate category.  In the focused region of pain at the posterior lateral joint line, there is an area of posterior tibial cartilage loss.  I suspect this to be the most likely pain generator for his posterior lateral pain.  However he does have focused tenderness today on exam overlying his lateral hamstrings and there could be component of tightness and tendinopathy that is not identified on MRI.    After discussing risk benefits and alternatives, we agreed to pursue corticosteroid injection intra-articular to determine if this can relieve his pain at the posterior lateral aspect of knee.  If this does not provide any relief, would then likely have him return to physical therapy with an additional order for dry needling of the lateral hamstring tendon.    I would like to see him back in the next 6 weeks to assess his progress.    PROCEDURE    Left Knee Injection - Intraarticular  The patient was informed of the risks and the benefits of the procedure and a written consent was signed.  The patient s left knee was prepped with chlorhexidine in sterile fashion.   40 mg of triamcinolone suspension was drawn up into a 5 mL syringe with 4 mL of 1% lidocaine.  Injection was performed using substerile technique.  A 1.5-inch 22-gauge needle was used to enter the lateral aspect of the left knee.  Injection performed successfully without difficulty.  There were no complications. The patient tolerated the procedure well. There was negligible bleeding.   The patient was instructed to ice the knee upon leaving clinic and refrain from overuse over the next 3 days.   The  patient was instructed to call or go to the emergency room with any unusual pain, swelling, redness, or if otherwise concerned.  A follow up appointment will be scheduled to evaluate response to the injection, and to assess range of motion and pain.      Large Joint Injection/Arthocentesis: L knee joint    Date/Time: 11/15/2023 2:58 PM    Performed by: Cedric Lopez DO  Authorized by: Cedric Lopez DO    Indications:  Pain  Needle Size:  22 G  Guidance: landmark guided    Location:  Knee      Medications:  40 mg triamcinolone 40 MG/ML; 4 mL lidocaine 1 %  Outcome:  Tolerated well, no immediate complications  Procedure discussed: discussed risks, benefits, and alternatives    Consent Given by:  Patient  Timeout: timeout called immediately prior to procedure    Prep: patient was prepped and draped in usual sterile fashion        22 minutes on date of the encounter performing chart review, history and examination, independent imaging review, documentation, and additional activities noted above explaining options.  In addition to encounter time, separate time of 10 minutes utilized for procedure.  See procedure note for details.

## 2023-11-15 ENCOUNTER — OFFICE VISIT (OUTPATIENT)
Dept: ORTHOPEDICS | Facility: CLINIC | Age: 63
End: 2023-11-15
Payer: COMMERCIAL

## 2023-11-15 DIAGNOSIS — G89.29 CHRONIC PAIN OF LEFT KNEE: Primary | ICD-10-CM

## 2023-11-15 DIAGNOSIS — M25.562 CHRONIC PAIN OF LEFT KNEE: Primary | ICD-10-CM

## 2023-11-15 PROCEDURE — 20610 DRAIN/INJ JOINT/BURSA W/O US: CPT | Mod: LT | Performed by: FAMILY MEDICINE

## 2023-11-15 PROCEDURE — 99213 OFFICE O/P EST LOW 20 MIN: CPT | Mod: 25 | Performed by: FAMILY MEDICINE

## 2023-11-15 RX ORDER — TRIAMCINOLONE ACETONIDE 40 MG/ML
40 INJECTION, SUSPENSION INTRA-ARTICULAR; INTRAMUSCULAR
Status: SHIPPED | OUTPATIENT
Start: 2023-11-15

## 2023-11-15 RX ORDER — LIDOCAINE HYDROCHLORIDE 10 MG/ML
4 INJECTION, SOLUTION INFILTRATION; PERINEURAL
Status: SHIPPED | OUTPATIENT
Start: 2023-11-15

## 2023-11-15 RX ADMIN — TRIAMCINOLONE ACETONIDE 40 MG: 40 INJECTION, SUSPENSION INTRA-ARTICULAR; INTRAMUSCULAR at 14:58

## 2023-11-15 RX ADMIN — LIDOCAINE HYDROCHLORIDE 4 ML: 10 INJECTION, SOLUTION INFILTRATION; PERINEURAL at 14:58

## 2023-11-15 ASSESSMENT — PAIN SCALES - GENERAL: PAINLEVEL: EXTREME PAIN (9)

## 2023-11-15 NOTE — LETTER
11/15/2023         RE: Walt Yin  [address] [place] [address]        Dear Colleague,    Thank you for referring your patient, Walt Yin, to the [facility] [place]. Please see a copy of my visit note below.    ESTABLISHED PATIENT FOLLOW-UP:  Pain of the Left Knee       HISTORY OF PRESENT ILLNESS  Mr. Yin is a pleasant 63 year old year old male who presents to clinic today for follow-up of L knee pain.     Date of injury: chronic  Date last seen: 10/19/23  Following Therapeutic Plan: Continue activity as tolerated; MRI ordered  Pain: Getting worse with walking and with L knee flexion  Function: Does have to go downstairs  one leg at a time due to painful L knee flexion  Interval History: MRI completed 10/23/23; completed PT; is not doing HEP due to pain    Additional medical/Social/Surgical histories reviewed in [facility] and updated as appropriate.    REVIEW OF SYSTEMS (11/13/2023)  CONSTITUTIONAL: Denies fever and weight loss  GASTROINTESTINAL: Denies abdominal pain, nausea, vomiting  MUSCULOSKELETAL: See HPI  SKIN: Denies any recent rash or lesion  NEUROLOGICAL: Denies numbness or focal weakness     PHYSICAL EXAM  There were no vitals taken for this visit.    General  - normal appearance, in no obvious distress  Musculoskeletal - left knee  - stance: normal gait without limp  - inspection: no effusion  - palpation: posterior and posterolateral joint line tenderness  - ROM: 120 degrees flexion, -5 degrees extension, pain at terminal flexion passively  - strength: 5/5 in flexion, 5/5 in extension  Neuro  - no sensory or motor deficit, grossly normal coordination, normal muscle tone    IMAGING :     EXAM: MR KNEE LEFT W/O CONTRAST  LOCATION: [facility]  DATE: 10/23/2023     INDICATION: Chronic pain of left knee.  COMPARISON: 10/19/2023.  TECHNIQUE: Unenhanced.                                                                    IMPRESSION:  1.  Complex horizontal and free margin radial tear medial meniscus.  2.  Mild to moderate medial and patellofemoral compartment cartilage loss.   3.  Intrasubstance degeneration anterior horn lateral meniscus without a discrete tear. Mild lateral compartment cartilage loss.  4.  Moderate degeneration and intrasubstance cystic change proximal fibers of the ACL. No tear.  5.  Small joint effusion.     ASSESSMENT & PLAN  Mr. Yin is a 63 year old year old male who presents to clinic today for follow-up regarding chronic left knee pain.    MRIs today revealing tricompartmental degenerative changes in the mild to moderate category.  In the focused region of pain at the posterior lateral joint line, there is an area of posterior tibial cartilage loss.  I suspect this to be the most likely pain generator for his posterior lateral pain.  However he does have focused tenderness today on exam overlying his lateral hamstrings and there could be component of tightness and tendinopathy that is not identified on MRI.    After discussing risk benefits and alternatives, we agreed to pursue corticosteroid injection intra-articular to determine if this can relieve his pain at the posterior lateral aspect of knee.  If this does not provide any relief, would then likely have him return to physical therapy with an additional order for dry needling of the lateral hamstring tendon.    I would like to see him back in the next 6 weeks to assess his progress.    PROCEDURE    Left Knee Injection - Intraarticular  The patient was informed of the risks and the benefits of the procedure and a written consent was signed.  The patient s left knee was prepped with chlorhexidine in sterile fashion.   40 mg of triamcinolone suspension was drawn up into a 5 mL syringe with 4 mL of 1% lidocaine.  Injection was performed using substerile technique.  A 1.5-inch 22-gauge needle was used to enter the lateral aspect of the left knee.   Injection performed successfully without difficulty.  There were no complications. The patient tolerated the procedure well. There was negligible bleeding.   The patient was instructed to ice the knee upon leaving clinic and refrain from overuse over the next 3 days.   The patient was instructed to call or go to the emergency room with any unusual pain, swelling, redness, or if otherwise concerned.  A follow up appointment will be scheduled to evaluate response to the injection, and to assess range of motion and pain.      Large Joint Injection/Arthocentesis: L knee joint    Date/Time: 11/15/2023 2:58 PM    Performed by: Cedric Lopez DO  Authorized by: Cedric Lopez DO    Indications:  Pain  Needle Size:  22 G  Guidance: landmark guided    Location:  Knee      Medications:  40 mg triamcinolone 40 MG/ML; 4 mL lidocaine 1 %  Outcome:  Tolerated well, no immediate complications  Procedure discussed: discussed risks, benefits, and alternatives    Consent Given by:  Patient  Timeout: timeout called immediately prior to procedure    Prep: patient was prepped and draped in usual sterile fashion        22 minutes on date of the encounter performing chart review, history and examination, independent imaging review, documentation, and additional activities noted above explaining options.  In addition to encounter time, separate time of 10 minutes utilized for procedure.  See procedure note for details.           Again, thank you for allowing me to participate in the care of your patient.        Sincerely,        Cedric Lopez DO

## 2024-03-07 ENCOUNTER — OFFICE VISIT (OUTPATIENT)
Dept: ORTHOPEDICS | Facility: CLINIC | Age: 64
End: 2024-03-07
Payer: COMMERCIAL

## 2024-03-07 DIAGNOSIS — M23.204 DEGENERATIVE TEAR OF MEDIAL MENISCUS OF LEFT KNEE: ICD-10-CM

## 2024-03-07 DIAGNOSIS — M25.562 CHRONIC PAIN OF LEFT KNEE: Primary | ICD-10-CM

## 2024-03-07 DIAGNOSIS — G89.29 CHRONIC PAIN OF LEFT KNEE: Primary | ICD-10-CM

## 2024-03-07 DIAGNOSIS — M17.12 PRIMARY OSTEOARTHRITIS OF LEFT KNEE: ICD-10-CM

## 2024-03-07 PROCEDURE — 99213 OFFICE O/P EST LOW 20 MIN: CPT | Performed by: FAMILY MEDICINE

## 2024-03-07 SDOH — HEALTH STABILITY: PHYSICAL HEALTH: ON AVERAGE, HOW MANY MINUTES DO YOU ENGAGE IN EXERCISE AT THIS LEVEL?: 0 MIN

## 2024-03-07 SDOH — HEALTH STABILITY: PHYSICAL HEALTH: ON AVERAGE, HOW MANY DAYS PER WEEK DO YOU ENGAGE IN MODERATE TO STRENUOUS EXERCISE (LIKE A BRISK WALK)?: 0 DAYS

## 2024-03-07 ASSESSMENT — PAIN SCALES - GENERAL: PAINLEVEL: EXTREME PAIN (9)

## 2024-03-07 NOTE — LETTER
3/7/2024         RE: Walt Yin  [address] [place] [address]        Dear Colleague,    Thank you for referring your patient, Walt Yin, to the [facility] [place]. Please see a copy of my visit note below.    ESTABLISHED PATIENT FOLLOW-UP:  No chief complaint on file.       HISTORY OF PRESENT ILLNESS  Mr. Yin is a pleasant 63 year old year old male who presents to clinic today for follow-up of left knee pain.    Date of injury/onset:   Date last seen: 11/15/2023 for a left knee corticosteroid injection  Following Therapeutic Plan: No current symptoms relieving strategies being used  Pain: with prolonged walking and knee flexion.   Function: pain with walking  Interval History: Presents to discuss next options.  He notes [place] no improvement to date since our last visit on 11/15/2023.    Treatment to date:  -Corticosteroid injection of left knee completed on 11/15/2023 allowed for minimal relief in knee pain.    -Physical therapy with Piyush a day between 6/20/2022 and 7/19/2022  -Use of knee sleeve  -Activity modification and rest from walking    Review of Systems:  Do you have fever, chills, weight loss? No  Do you have any vision problems? No  Do you have any chest pain or edema? No  Do you have any shortness of breath or wheezing?  No  Do you have stomach problems? No  Do you have any numbness or focal weakness? No  Do you have diabetes? No  Do you have problems with bleeding or clotting? No  Do you have an rashes or other skin lesions? No    MEDICAL HISTORY  Patient Active Problem List   Diagnosis     Prediabetes     Hyperlipidemia LDL goal <160     ZENIA (obstructive sleep apnea)       Current Outpatient Medications   Medication Sig Dispense Refill     aspirin (ASA) 81 MG tablet Take 81 mg by mouth       cetirizine (ZYRTEC) 10 MG tablet Take 1 tablet (10 mg) by mouth daily as needed for allergies 90 tablet 11     fluticasone (FLONASE) 50 MCG/ACT nasal  spray USE 2 SPRAYS INTO EACH NOSTRIL DAILY 16 g 3     olopatadine (PATANOL) 0.1 % ophthalmic solution Place 1 drop into both eyes 2 times daily 5 mL 11     simvastatin (ZOCOR) 40 MG tablet TAKE ONE TABLET BY MOUTH EVERY NIGHT AT BEDTIME 90 tablet 11       No Known Allergies    No family history on file.    Additional medical/Social/Surgical histories reviewed in [facility] and updated as appropriate.       PHYSICAL EXAM  There were no vitals taken for this visit.    General  - normal appearance, in no obvious distress  Musculoskeletal - Left knee  - stance: normal gait without limp  - inspection: trace effusion  - palpation: medial joint line tenderness  - ROM: 135 degrees flexion, -5 degrees extension, pain at terminal extension passively  - strength: 5/5 in flexion, 5/5 in extension  - special tests:  (-) Lachman  (-) anterior drawer  (+) Jimi  (-) varus at 0 and 30 degrees flexion  (-) valgus at 0 and 30 degrees flexion  Neuro  - no sensory or motor deficit, grossly normal coordination, normal muscle tone      IMAGING :   EXAM: MR KNEE LEFT W/O CONTRAST  LOCATION: [facility]  DATE: 10/23/2023     INDICATION: Chronic pain of left knee.  COMPARISON: 10/19/2023.  TECHNIQUE: Unenhanced.     FINDINGS:     MEDIAL COMPARTMENT:   -Meniscus: Horizontal cleavage tear posterior horn medial meniscus. Superimposed free margin radial component to the tear along the medial meniscal body.  -Cartilage: Partial-thickness grade 2 to grade 3 localized cartilage loss along the central to posterior femoral condyle. There may be a focal area of superimposed full-thickness cartilage fissuring. Tibial articulation is maintained     LATERAL COMPARTMENT:  -Meniscus: Intrasubstance degeneration anterior horn lateral meniscus without a discrete tear.   -Cartilage: Bipolar grade 2 to grade 3 cartilage loss and fissuring along a small portion of the posterior compartment.     PATELLOFEMORAL COMPARTMENT:   -Alignment:  Patella midline. No subluxation or tilting.   -Cartilage: Scattered high-grade cartilage loss and fissuring along the patella apex and medial patella facet. Small partial-thickness cartilage fissure lateral trochlear facet.     CRUCIATE LIGAMENTS:   -ACL: Moderate degeneration and intrasubstance cystic change proximal fibers of the ACL. No tear.  -PCL: Normal.     COLLATERAL LIGAMENTS:   -Medial collateral ligament: Superficial and deep fibers are normal.  -Lateral collateral ligament: Normal.     POSTEROMEDIAL CORNER:  -Distal semimembranosus tendon is normal.   -Pes anserine tendons are normal. Posteromedial corner complex ligaments are intact.     POSTEROLATERAL CORNER:   -Popliteal tendon is intact. No tendinopathy. Small amount of fluid in the tendon sheath.  -Biceps femoris tendon and posterolateral corner complex ligaments are intact.     EXTENSOR MECHANISM:   -Quadriceps tendon: Normal.  -Patellar tendon: Normal.  -Patellofemoral ligaments and retinacula: Intact.     JOINT:   -Small joint effusion.     BONES:  -No fracture or concerning marrow replacing lesion.     SOFT TISSUES:   -No popliteal cyst. No acute muscular injury or soft tissue mass.                                                                       IMPRESSION:  1.  Complex horizontal and free margin radial tear medial meniscus.  2.  Mild to moderate medial and patellofemoral compartment cartilage loss.   3.  Intrasubstance degeneration anterior horn lateral meniscus without a discrete tear. Mild lateral compartment cartilage loss.  4.  Moderate degeneration and intrasubstance cystic change proximal fibers of the ACL. No tear.  5.  Small joint effusion.     ASSESSMENT & PLAN  Mr. Yin is a 63 year old year old male who presents to clinic today with chronic left knee pain over the past 9 months with no improvement to date with physical therapy, injection, bracing, and activity modification.    The majority of pain continues at the posterior  popliteal fossa region, medially and laterally.  I suspect his pain is possibly emanating from the posterior horn of his medial lateral meniscus.  He certainly has degenerative changes of these regions however I expected improvement with corticosteroid injection and therapy.    After reviewing his MRI and x-ray it does not seem his knee is at end-stage in regards to his chondromalacia and therefore I will recommend a consultation with one of our knee arthroscopic surgeons to see if there is any surgical option that he may benefit from.  He should continue to work on hamstring stretching and quad strengthening.  He has an HEP from Piyush tripp.    Follow-up with me as needed or if his pain is deemed related to arthritic changes, we may consider pursuing hyaluronic acid injection next.    It was a pleasure seeing Walt.    Cedric Lopez DO, [facility]  Primary Care Sports Medicine        Again, thank you for allowing me to participate in the care of your patient.        Sincerely,        Cedric Lopez DO

## 2024-03-07 NOTE — PROGRESS NOTES
ESTABLISHED PATIENT FOLLOW-UP:  No chief complaint on file.       HISTORY OF PRESENT ILLNESS  Mr. Yin is a pleasant 63 year old year old male who presents to clinic today for follow-up of left knee pain.    Date of injury/onset:   Date last seen: 11/15/2023 for a left knee corticosteroid injection  Following Therapeutic Plan: No current symptoms relieving strategies being used  Pain: with prolonged walking and knee flexion.   Function: pain with walking  Interval History: Presents to discuss next options.  He notes [place] no improvement to date since our last visit on 11/15/2023.    Treatment to date:  -Corticosteroid injection of left knee completed on 11/15/2023 allowed for minimal relief in knee pain.    -Physical therapy with Piyush a day between 6/20/2022 and 7/19/2022  -Use of knee sleeve  -Activity modification and rest from walking    Review of Systems:  Do you have fever, chills, weight loss? No  Do you have any vision problems? No  Do you have any chest pain or edema? No  Do you have any shortness of breath or wheezing?  No  Do you have stomach problems? No  Do you have any numbness or focal weakness? No  Do you have diabetes? No  Do you have problems with bleeding or clotting? No  Do you have an rashes or other skin lesions? No    MEDICAL HISTORY  Patient Active Problem List   Diagnosis    Prediabetes    Hyperlipidemia LDL goal <160    ZENIA (obstructive sleep apnea)       Current Outpatient Medications   Medication Sig Dispense Refill    aspirin (ASA) 81 MG tablet Take 81 mg by mouth      cetirizine (ZYRTEC) 10 MG tablet Take 1 tablet (10 mg) by mouth daily as needed for allergies 90 tablet 11    fluticasone (FLONASE) 50 MCG/ACT nasal spray USE 2 SPRAYS INTO EACH NOSTRIL DAILY 16 g 3    olopatadine (PATANOL) 0.1 % ophthalmic solution Place 1 drop into both eyes 2 times daily 5 mL 11    simvastatin (ZOCOR) 40 MG tablet TAKE ONE TABLET BY MOUTH EVERY NIGHT AT BEDTIME 90 tablet 11       No Known  Allergies    No family history on file.    Additional medical/Social/Surgical histories reviewed in [facility] and updated as appropriate.       PHYSICAL EXAM  There were no vitals taken for this visit.    General  - normal appearance, in no obvious distress  Musculoskeletal - Left knee  - stance: normal gait without limp  - inspection: trace effusion  - palpation: medial joint line tenderness  - ROM: 135 degrees flexion, -5 degrees extension, pain at terminal extension passively  - strength: 5/5 in flexion, 5/5 in extension  - special tests:  (-) Lachman  (-) anterior drawer  (+) Jimi  (-) varus at 0 and 30 degrees flexion  (-) valgus at 0 and 30 degrees flexion  Neuro  - no sensory or motor deficit, grossly normal coordination, normal muscle tone      IMAGING :   EXAM: MR KNEE LEFT W/O CONTRAST  LOCATION: [facility]  DATE: 10/23/2023     INDICATION: Chronic pain of left knee.  COMPARISON: 10/19/2023.  TECHNIQUE: Unenhanced.     FINDINGS:     MEDIAL COMPARTMENT:   -Meniscus: Horizontal cleavage tear posterior horn medial meniscus. Superimposed free margin radial component to the tear along the medial meniscal body.  -Cartilage: Partial-thickness grade 2 to grade 3 localized cartilage loss along the central to posterior femoral condyle. There may be a focal area of superimposed full-thickness cartilage fissuring. Tibial articulation is maintained     LATERAL COMPARTMENT:  -Meniscus: Intrasubstance degeneration anterior horn lateral meniscus without a discrete tear.   -Cartilage: Bipolar grade 2 to grade 3 cartilage loss and fissuring along a small portion of the posterior compartment.     PATELLOFEMORAL COMPARTMENT:   -Alignment: Patella midline. No subluxation or tilting.   -Cartilage: Scattered high-grade cartilage loss and fissuring along the patella apex and medial patella facet. Small partial-thickness cartilage fissure lateral trochlear facet.     CRUCIATE LIGAMENTS:   -ACL: Moderate  degeneration and intrasubstance cystic change proximal fibers of the ACL. No tear.  -PCL: Normal.     COLLATERAL LIGAMENTS:   -Medial collateral ligament: Superficial and deep fibers are normal.  -Lateral collateral ligament: Normal.     POSTEROMEDIAL CORNER:  -Distal semimembranosus tendon is normal.   -Pes anserine tendons are normal. Posteromedial corner complex ligaments are intact.     POSTEROLATERAL CORNER:   -Popliteal tendon is intact. No tendinopathy. Small amount of fluid in the tendon sheath.  -Biceps femoris tendon and posterolateral corner complex ligaments are intact.     EXTENSOR MECHANISM:   -Quadriceps tendon: Normal.  -Patellar tendon: Normal.  -Patellofemoral ligaments and retinacula: Intact.     JOINT:   -Small joint effusion.     BONES:  -No fracture or concerning marrow replacing lesion.     SOFT TISSUES:   -No popliteal cyst. No acute muscular injury or soft tissue mass.                                                                       IMPRESSION:  1.  Complex horizontal and free margin radial tear medial meniscus.  2.  Mild to moderate medial and patellofemoral compartment cartilage loss.   3.  Intrasubstance degeneration anterior horn lateral meniscus without a discrete tear. Mild lateral compartment cartilage loss.  4.  Moderate degeneration and intrasubstance cystic change proximal fibers of the ACL. No tear.  5.  Small joint effusion.     ASSESSMENT & PLAN  Mr. Yin is a 63 year old year old male who presents to clinic today with chronic left knee pain over the past 9 months with no improvement to date with physical therapy, injection, bracing, and activity modification.    The majority of pain continues at the posterior popliteal fossa region, medially and laterally.  I suspect his pain is possibly emanating from the posterior horn of his medial lateral meniscus.  He certainly has degenerative changes of these regions however I expected improvement with corticosteroid injection and  therapy.    After reviewing his MRI and x-ray it does not seem his knee is at end-stage in regards to his chondromalacia and therefore I will recommend a consultation with one of our knee arthroscopic surgeons to see if there is any surgical option that he may benefit from.  He should continue to work on hamstring stretching and quad strengthening.  He has an HEP from Piyush tripp.    Follow-up with me as needed or if his pain is deemed related to arthritic changes, we may consider pursuing hyaluronic acid injection next.    It was a pleasure seeing Walt.    Cedric Lopez DO, CAQSM  Primary Care Sports Medicine

## 2024-03-07 NOTE — COMMUNITY RESOURCES LIST (ENGLISH)
03/07/2024   [facility] [employer]  N/A  For questions about this resource list or additional care needs, please contact your primary care clinic or care manager.  Phone: [phone]   Email: N/A   Address: [address] [place] [id number]   Hours: N/A        Exercise and Recreation       Gym or workout facility  1  [address] [place] [address] - Kickboxing Classes Distance: 5.81 miles      In-Person   [address] [place] [address]  Language: English  Hours: Mon - Fri 6:00 AM - 12:30 PM , Mon - Fri 3:00 PM - 8:00 PM , Sat 8:00 AM - 12:00 PM  Fees: Self Pay   Phone: [phone]) [phone] Website: [email or web address]     2  [employer] [facility] Distance: 6 miles      In-Person   [address] [place] [address]  Language: English  Hours: Mon - Sun Open 24 Hours  Fees: Insurance, Self Pay, Sliding Fee   Phone: [phone] Website: [email or web address]/[email or web address]-[email or web address]          Important Numbers & Websites       Emergency Services   911  [facility] Services   311  Poison Control   (422) 765-4950  Suicide Prevention Lifeline   (300) 222-7730 (TALK)  Child Abuse Hotline   (822) 148-2756 (4-A-Child)  Sexual Assault Hotline   (677) 433-2387 (HOPE)  National Runaway Safeline   (572) 322-7187 (RUNAWAY)  All-Options Talkline   (269) 373-7177  Substance Abuse Referral   (681) 253-7151 (HELP)

## 2024-03-18 ENCOUNTER — OFFICE VISIT (OUTPATIENT)
Dept: ORTHOPEDICS | Facility: CLINIC | Age: 64
End: 2024-03-18
Attending: FAMILY MEDICINE
Payer: COMMERCIAL

## 2024-03-18 ENCOUNTER — ANCILLARY PROCEDURE (OUTPATIENT)
Dept: GENERAL RADIOLOGY | Facility: CLINIC | Age: 64
End: 2024-03-18
Attending: STUDENT IN AN ORGANIZED HEALTH CARE EDUCATION/TRAINING PROGRAM
Payer: COMMERCIAL

## 2024-03-18 DIAGNOSIS — M25.562 CHRONIC PAIN OF LEFT KNEE: ICD-10-CM

## 2024-03-18 DIAGNOSIS — G89.29 CHRONIC PAIN OF LEFT KNEE: ICD-10-CM

## 2024-03-18 PROCEDURE — 73564 X-RAY EXAM KNEE 4 OR MORE: CPT | Mod: TC | Performed by: RADIOLOGY

## 2024-03-18 PROCEDURE — 99204 OFFICE O/P NEW MOD 45 MIN: CPT | Performed by: STUDENT IN AN ORGANIZED HEALTH CARE EDUCATION/TRAINING PROGRAM

## 2024-03-18 ASSESSMENT — KOOS JR
TWISING OR PIVOTING ON KNEE: MODERATE
RISING FROM SITTING: MODERATE
STANDING UPRIGHT: MODERATE
HOW SEVERE IS YOUR KNEE STIFFNESS AFTER FIRST WAKING IN MORNING: MODERATE
KOOS JR SCORING: 57.14
STRAIGHTENING KNEE FULLY: MODERATE
GOING UP OR DOWN STAIRS: MODERATE

## 2024-03-18 NOTE — PROGRESS NOTES
CC: Left Knee Pain    HPI: Patient is a 63-year-old male who presents here today with his wife for 1 year of left knee pain.  He denies any traumatic event.  He localizes the pain to the posterior aspect of his knee.  He states that it comes and goes.  Is more painful while he is walking and with doing stairs.  He continues to be symptomatic and is affecting his ability to exercise and perform his activities of daily living.  He is not currently taking any oral pain medication for this.  He has tried physical therapy in the past.  He had an intra-articular corticosteroid injection in November 2023.  He states this gave 1 month of 100% pain relief, but ultimately wore off.  He denies any mechanical symptoms.  He denies any loss of range of motion.  He denies any feelings of instability in the knee.  He has never had any prior surgery.  He denies any major injury to his left knee.    He is otherwise healthy.  He works as a .  He does not smoke.  He enjoys walking for exercise.         Patient Active Problem List   Diagnosis    Prediabetes    Hyperlipidemia LDL goal <160    ZENIA (obstructive sleep apnea)        No past medical history on file.     No past surgical history on file.       Current Outpatient Medications   Medication Sig Dispense Refill    aspirin (ASA) 81 MG tablet Take 81 mg by mouth      cetirizine (ZYRTEC) 10 MG tablet Take 1 tablet (10 mg) by mouth daily as needed for allergies 90 tablet 11    fluticasone (FLONASE) 50 MCG/ACT nasal spray USE 2 SPRAYS INTO EACH NOSTRIL DAILY 16 g 3    olopatadine (PATANOL) 0.1 % ophthalmic solution Place 1 drop into both eyes 2 times daily 5 mL 11    simvastatin (ZOCOR) 40 MG tablet TAKE ONE TABLET BY MOUTH EVERY NIGHT AT BEDTIME 90 tablet 11        No Known Allergies     No family history on file.       Social History     Tobacco Use    Smoking status: Never    Smokeless tobacco: Current   Substance Use Topics    Alcohol use: Yes     Comment: Social             Objective:  Physical Exam:  LLE: No pain with axial load or logroll of the hip.  No open wounds, lacerations, or prior surgical incisions about the knee.  No significant edema or ecchymosis.  No erythema or drainage.  No effusion of the knee joint.  No pain to palpation over the quad tendon patella or patellar tendon.  No pain to palpation over the medial or lateral joint line.  No pain to palpation over the femoral origin or tibial insertion of the MCL.  No pain to palpation over the femoral origin or fibular insertion of the LCL.  Pain to palpation over the posterior joint line of the knee and popliteal fossa both the medial and lateral aspect of the knee.  Passive range of motion 0 to 150 degrees of knee flexion.  Pain in the posterior aspect of his knee with full knee extension.  Active range of motion is equivalent to passive range of motion.  5/5 strength in flexion and extension.  Stable to varus and valgus stress at 0 and 30 degrees of knee flexion with firm endpoint.  Negative Lachman.  Stable anterior posterior drawer.  Grossly neurovascular intact.    Imagin view x-ray of the left knee from today including weightbearing views show no fracture or acute bony pathology.  Well-maintained joint space in the medial and lateral compartments.  Well-maintained joint space in the patellofemoral compartment.    MRI without contrast of the left knee from 2023 was reviewed.  This is notable for mild thinning of the cartilage from the medial femoral condyle.  Horizontal tear of the posterior horn of the medial meniscus.  No lateral meniscus pathology mild chondromalacia of the patellofemoral compartment.  Cystic changes of the ACL, most notable on the femoral origin.    Assessment and Plan: Patient is a 63-year-old male seen here today with his wife for 1 year of atraumatic posterior knee pain.  I the opportunity review his images with him today.  He has 2 normal pathologies on his MRI.  First is a  horizontal tear of the posterior horn of his medial meniscus.  Second is mild mucoid cystic changes of the ACL at the femoral origin.  I the opportunity review post his diagnosis with him today.  Certainly they both may be contributing to his symptoms of pain in the posterior aspect of his knee, particularly with full extension.  He has very mild chondral thinning in the medial compartment and patellofemoral compartment.  He has well over 50% of his joint space maintained on weightbearing x-rays today.  We discussed operative and nonoperative options.  We discussed nonoperative management in the form of activity modification, oral anti-inflammatory medication, physical therapy, corticosteroid injection, and hyaluronic acid injections.  We discussed operative intervention in the form of left knee arthroscopy with partial medial meniscectomy and partial versus total ACL debridement.  I discussed with him the operative techniques.  We discussed that these would be surgical intervention for symptomatic relief of his pain.  Certainly some of these symptoms could be from his mild chondromalacia, and these surgeries would not give him relief of pain from cartilage loss.  We discussed risk and benefits of operative intervention including but not limited to bleeding, infection, failure to cure pain, posttraumatic arthritis, instability, need for secondary surgery, postoperative DVT or PE, stiffness, loss of function, loss of limb and loss of life.  I the opportunity answer questions.  I provided them an informational packet about knee arthroscopy, as well as the postoperative protocol.  He and his wife would like more time to think about as a family.  I think that is very reasonable.  They can call back at any time, or follow-up with me in clinic at any time to further discuss operative intervention.  They can call back at any time to schedule surgery if they wish to move forward with a left knee arthroscopic partial medial  meniscectomy and ACL debridement.      Drake Hazel MD    [facility]   Department of Orthopedic Surgery      Disclaimer: This note consists of symbols derived from keyboarding, dictation and/or voice recognition software. As a result, there may be errors in the script that have gone undetected. Please consider this when interpreting information found in this chart.

## 2024-03-18 NOTE — LETTER
3/18/2024         RE: Walt Yin  [address] [place] [address]        Dear Colleague,    Thank you for referring your patient, Walt Yin, to the [facility] [place]. Please see a copy of my visit note below.    CC: Left Knee Pain    HPI: Patient is a 63-year-old male who presents here today with his wife for 1 year of left knee pain.  He denies any traumatic event.  He localizes the pain to the posterior aspect of his knee.  He states that it comes and goes.  Is more painful while he is walking and with doing stairs.  He continues to be symptomatic and is affecting his ability to exercise and perform his activities of daily living.  He is not currently taking any oral pain medication for this.  He has tried physical therapy in the past.  He had an intra-articular corticosteroid injection in November 2023.  He states this gave 1 month of 100% pain relief, but ultimately wore off.  He denies any mechanical symptoms.  He denies any loss of range of motion.  He denies any feelings of instability in the knee.  He has never had any prior surgery.  He denies any major injury to his left knee.    He is otherwise healthy.  He works as a .  He does not smoke.  He enjoys walking for exercise.         Patient Active Problem List   Diagnosis     Prediabetes     Hyperlipidemia LDL goal <160     ZENIA (obstructive sleep apnea)        No past medical history on file.     No past surgical history on file.       Current Outpatient Medications   Medication Sig Dispense Refill     aspirin (ASA) 81 MG tablet Take 81 mg by mouth       cetirizine (ZYRTEC) 10 MG tablet Take 1 tablet (10 mg) by mouth daily as needed for allergies 90 tablet 11     fluticasone (FLONASE) 50 MCG/ACT nasal spray USE 2 SPRAYS INTO EACH NOSTRIL DAILY 16 g 3     olopatadine (PATANOL) 0.1 % ophthalmic solution Place 1 drop into both eyes 2 times daily 5 mL 11     simvastatin (ZOCOR) 40 MG tablet TAKE ONE  TABLET BY MOUTH EVERY NIGHT AT BEDTIME 90 tablet 11        No Known Allergies     No family history on file.       Social History     Tobacco Use     Smoking status: Never     Smokeless tobacco: Current   Substance Use Topics     Alcohol use: Yes     Comment: Social            Objective:  Physical Exam:  LLE: No pain with axial load or logroll of the hip.  No open wounds, lacerations, or prior surgical incisions about the knee.  No significant edema or ecchymosis.  No erythema or drainage.  No effusion of the knee joint.  No pain to palpation over the quad tendon patella or patellar tendon.  No pain to palpation over the medial or lateral joint line.  No pain to palpation over the femoral origin or tibial insertion of the MCL.  No pain to palpation over the femoral origin or fibular insertion of the LCL.  Pain to palpation over the posterior joint line of the knee and popliteal fossa both the medial and lateral aspect of the knee.  Passive range of motion 0 to 150 degrees of knee flexion.  Pain in the posterior aspect of his knee with full knee extension.  Active range of motion is equivalent to passive range of motion.  5/5 strength in flexion and extension.  Stable to varus and valgus stress at 0 and 30 degrees of knee flexion with firm endpoint.  Negative Lachman.  Stable anterior posterior drawer.  Grossly neurovascular intact.    Imagin view x-ray of the left knee from today including weightbearing views show no fracture or acute bony pathology.  Well-maintained joint space in the medial and lateral compartments.  Well-maintained joint space in the patellofemoral compartment.    MRI without contrast of the left knee from 2023 was reviewed.  This is notable for mild thinning of the cartilage from the medial femoral condyle.  Horizontal tear of the posterior horn of the medial meniscus.  No lateral meniscus pathology mild chondromalacia of the patellofemoral compartment.  Cystic changes of the ACL,  most notable on the femoral origin.    Assessment and Plan: Patient is a 63-year-old male seen here today with his wife for 1 year of atraumatic posterior knee pain.  I the opportunity review his images with him today.  He has 2 normal pathologies on his MRI.  First is a horizontal tear of the posterior horn of his medial meniscus.  Second is mild mucoid cystic changes of the ACL at the femoral origin.  I the opportunity review post his diagnosis with him today.  Certainly they both may be contributing to his symptoms of pain in the posterior aspect of his knee, particularly with full extension.  He has very mild chondral thinning in the medial compartment and patellofemoral compartment.  He has well over 50% of his joint space maintained on weightbearing x-rays today.  We discussed operative and nonoperative options.  We discussed nonoperative management in the form of activity modification, oral anti-inflammatory medication, physical therapy, corticosteroid injection, and hyaluronic acid injections.  We discussed operative intervention in the form of left knee arthroscopy with partial medial meniscectomy and partial versus total ACL debridement.  I discussed with him the operative techniques.  We discussed that these would be surgical intervention for symptomatic relief of his pain.  Certainly some of these symptoms could be from his mild chondromalacia, and these surgeries would not give him relief of pain from cartilage loss.  We discussed risk and benefits of operative intervention including but not limited to bleeding, infection, failure to cure pain, posttraumatic arthritis, instability, need for secondary surgery, postoperative DVT or PE, stiffness, loss of function, loss of limb and loss of life.  I the opportunity answer questions.  I provided them an informational packet about knee arthroscopy, as well as the postoperative protocol.  He and his wife would like more time to think about as a family.  I  think that is very reasonable.  They can call back at any time, or follow-up with me in clinic at any time to further discuss operative intervention.  They can call back at any time to schedule surgery if they wish to move forward with a left knee arthroscopic partial medial meniscectomy and ACL debridement.      Drake Hazel MD    [facility]   Department of Orthopedic Surgery      Disclaimer: This note consists of symbols derived from keyboarding, dictation and/or voice recognition software. As a result, there may be errors in the script that have gone undetected. Please consider this when interpreting information found in this chart.        Again, thank you for allowing me to participate in the care of your patient.        Sincerely,        Drake Hazel MD

## 2024-04-02 ENCOUNTER — TELEPHONE (OUTPATIENT)
Dept: ORTHOPEDICS | Facility: CLINIC | Age: 64
End: 2024-04-02

## 2024-04-02 ENCOUNTER — TELEPHONE (OUTPATIENT)
Dept: ORTHOPEDICS | Facility: CLINIC | Age: 64
End: 2024-04-02
Payer: COMMERCIAL

## 2024-04-02 DIAGNOSIS — M23.204 DEGENERATIVE TEAR OF MEDIAL MENISCUS OF LEFT KNEE: Primary | ICD-10-CM

## 2024-04-02 DIAGNOSIS — S83.242D OTHER TEAR OF MEDIAL MENISCUS OF LEFT KNEE AS CURRENT INJURY, SUBSEQUENT ENCOUNTER: Primary | ICD-10-CM

## 2024-04-02 NOTE — TELEPHONE ENCOUNTER
Per last visit note patient would like to continue with surgery. Please place a case request for patient if this is acceptable.    Ezio Delgado ATC

## 2024-04-02 NOTE — TELEPHONE ENCOUNTER
CC: Telephone encounter to discuss surgery    This is a telephone encounter.  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  For full history and physical please see my note from March 18.  In short review, he continues to have pain over the medial and posterior aspect of his knee affecting his ability to do stairs and to go into deep knee flexion.  He feels the pain most of the posterior aspect of his knee.  He has trialed nonoperative management in the form of intra-articular corticosteroid injection, physical therapy, and activity modification.  1 I met with him and his wife in clinic, we discussed operative intervention in the form of a left knee arthroscopy with partial medial meniscectomy and ACL debridement versus complete arthroscopic ACL excision.  He called back into the office today stating he wished to move forward with scheduling surgery.  I think this is very reasonable.  We again discussed the plan for surgery, the operative technique, the postoperative protocol.  We again discussed the risks and benefits of operative intervention including but not limited to bleeding, infection, failure to cure pain, stiffness, posttraumatic arthritis, damage surrounding nerves and blood vessels, instability, loss of limb and loss of life.  I definitely answered his questions.  I will place his orders for a left knee arthroscopic partial medial meniscectomy and ACL debridement.      10 minutes were spent on the phone with the patient.    Drake Hazel MD    [facility]   Department of Orthopedic Surgery

## 2024-04-02 NOTE — TELEPHONE ENCOUNTER
Order(s): Other:   Reason for requested: Pt would like surgery on the L knee.  Wife is asking where the surgery will be done.    Date needed: asap  Provider name: Dr. Drake Hazel    Could we send this information to you in [employer] or would you prefer to receive a phone call?:   Patient would prefer a phone call   Okay to leave a detailed message?: Yes at Cell number on file:    Telephone Information:   Mobile [phone]

## 2024-04-02 NOTE — TELEPHONE ENCOUNTER
Informed patient that surgery will be in [place], A  will be calling them in the next couple days. Patient understood and will wait for call.    Ezio Delgado ATC

## 2024-04-03 ENCOUNTER — TELEPHONE (OUTPATIENT)
Dept: ORTHOPEDICS | Facility: CLINIC | Age: 64
End: 2024-04-03

## 2024-04-03 ENCOUNTER — OFFICE VISIT (OUTPATIENT)
Dept: PEDIATRICS | Facility: CLINIC | Age: 64
End: 2024-04-03
Payer: COMMERCIAL

## 2024-04-03 VITALS
RESPIRATION RATE: 16 BRPM | SYSTOLIC BLOOD PRESSURE: 124 MMHG | BODY MASS INDEX: 26.49 KG/M2 | HEIGHT: 65 IN | WEIGHT: 159 LBS | DIASTOLIC BLOOD PRESSURE: 72 MMHG | OXYGEN SATURATION: 100 % | HEART RATE: 72 BPM | TEMPERATURE: 97.4 F

## 2024-04-03 DIAGNOSIS — S83.242D TEAR OF MEDIAL MENISCUS OF LEFT KNEE, CURRENT, UNSPECIFIED TEAR TYPE, SUBSEQUENT ENCOUNTER: ICD-10-CM

## 2024-04-03 DIAGNOSIS — S83.242D OTHER TEAR OF MEDIAL MENISCUS OF LEFT KNEE AS CURRENT INJURY, SUBSEQUENT ENCOUNTER: Primary | ICD-10-CM

## 2024-04-03 DIAGNOSIS — Z98.890 S/P ARTHROSCOPY OF LEFT KNEE: ICD-10-CM

## 2024-04-03 DIAGNOSIS — E11.65 TYPE 2 DIABETES MELLITUS WITH HYPERGLYCEMIA, UNSPECIFIED WHETHER LONG TERM INSULIN USE (H): ICD-10-CM

## 2024-04-03 DIAGNOSIS — Z01.818 PREOP GENERAL PHYSICAL EXAM: Primary | ICD-10-CM

## 2024-04-03 DIAGNOSIS — G47.33 OSA (OBSTRUCTIVE SLEEP APNEA): ICD-10-CM

## 2024-04-03 DIAGNOSIS — R73.9 HYPERGLYCEMIA: ICD-10-CM

## 2024-04-03 LAB — HBA1C MFR BLD: 6.6 % (ref 0–5.6)

## 2024-04-03 PROCEDURE — 99214 OFFICE O/P EST MOD 30 MIN: CPT | Performed by: INTERNAL MEDICINE

## 2024-04-03 PROCEDURE — 83036 HEMOGLOBIN GLYCOSYLATED A1C: CPT | Performed by: INTERNAL MEDICINE

## 2024-04-03 PROCEDURE — 80048 BASIC METABOLIC PNL TOTAL CA: CPT | Performed by: INTERNAL MEDICINE

## 2024-04-03 PROCEDURE — 36415 COLL VENOUS BLD VENIPUNCTURE: CPT | Performed by: INTERNAL MEDICINE

## 2024-04-03 RX ORDER — OMEGA-3/DHA/EPA/FISH OIL 60 MG-90MG
CAPSULE ORAL DAILY
COMMUNITY

## 2024-04-03 ASSESSMENT — PAIN SCALES - GENERAL: PAINLEVEL: NO PAIN (0)

## 2024-04-03 NOTE — LETTER
Component      Latest Ref Rng 9/20/2023  8:57 AM   Sodium      136 - 145 mmol/L 138    Potassium      3.4 - 5.3 mmol/L 5.0    Chloride      98 - 107 mmol/L 101    Carbon Dioxide (CO2)      22 - 29 mmol/L 28    Anion Gap      7 - 15 mmol/L 9    Urea Nitrogen      8.0 - 23.0 mg/dL 7.7 (L)    Creatinine      0.67 - 1.17 mg/dL 0.73    Calcium      8.8 - 10.2 mg/dL 9.5    Glucose      70 - 99 mg/dL 126 (H)    Alkaline Phosphatase      40 - 129 U/L 129    AST      0 - 45 U/L 24    ALT      0 - 70 U/L 18    Protein Total      6.4 - 8.3 g/dL 7.9    Albumin      3.5 - 5.2 g/dL 4.7    Bilirubin Total      <=1.2 mg/dL 0.6    GFR Estimate      >60 mL/min/1.73m2 >90    Cholesterol      <200 mg/dL 155    Triglycerides      <150 mg/dL 54    HDL Cholesterol      >=40 mg/dL 77    LDL Cholesterol Calculated      <=100 mg/dL 67    Non HDL Cholesterol      <130 mg/dL 78    Hemoglobin A1C      0.0 - 5.6 % 6.4 (H)    PSA Tumor Marker      0.00 - 4.50 ng/mL 0.36       Legend:  (L) Low  (H) High

## 2024-04-03 NOTE — PROGRESS NOTES
Preoperative Evaluation  [facility]  [address]  [address]  [place] [address]  Phone: [phone]  Fax: [phone]  Primary Provider: Angie Ge  Pre-op Performing Provider: ANGIE GE  Apr 3, 2024       Walt is a 63 year old, presenting for the following:  Pre-Op Exam        4/3/2024     2:48 PM   Additional Questions   Roomed by Liseth   Accompanied by self         4/3/2024     2:48 PM   Patient Reported Additional Medications   Patient reports taking the following new medications no     Surgical Information  Surgery/Procedure: Left knee surgery  Surgery Location: [facility]  Surgeon: Dr Drake Hazel  Surgery Date: 4/23/24  Time of Surgery: TBD  Where patient plans to recover: At home with family  Fax number for surgical facility: Note does not need to be faxed, will be available electronically in Epic.    Assessment & Plan     The proposed surgical procedure is considered INTERMEDIATE risk.    (Z01.818) Preop general physical exam  (primary encounter diagnosis)    (S83.242D) Tear of medial meniscus of left knee, current, unspecified tear type, subsequent encounter    (R73.9) Hyperglycemia  Comment: prediabetes, due for follow-up labwork  Plan: Basic metabolic panel  (Ca, Cl, CO2, Creat,         Gluc, K, Na, BUN), Hemoglobin A1c          (G47.33) ZENIA (obstructive sleep apnea)  Comment:   Plan: History of sleep apnea; on CPAP.  Recommend postoperative CR monitor.  Monitor for hypoxia and cardiac arrhythmias.         Risks and Recommendations  The patient has the following additional risks and recommendations for perioperative complications:   - No identified additional risk factors other than previously addressed    Antiplatelet or Anticoagulation Medication Instructions   - aspirin: Discontinue aspirin 7-10 days prior to procedure to reduce bleeding risk. It should be resumed postoperatively.      Recommendation  APPROVAL GIVEN to proceed with proposed procedure, without further diagnostic evaluation.          Subjective     HPI related to upcoming procedure: Walt Yin is a 63 year old who presents for preoperative evaluation as requested by Dr Hazel prior to left knee arthroscopy for torn meniscus.         4/3/2024     2:48 PM   Preop Questions   1. Have you ever had a heart attack or stroke? No   2. Have you ever had surgery on your heart or blood vessels, such as a stent placement, a coronary artery bypass, or surgery on an artery in your head, neck, heart, or legs? No   3. Do you have chest pain with activity? No   4. Do you have a history of  heart failure? No   5. Do you currently have a cold, bronchitis or symptoms of other infection? No   6. Do you have a cough, shortness of breath, or wheezing? No   7. Do you or anyone in your family have previous history of blood clots? No   8. Do you or does anyone in your family have a serious bleeding problem such as prolonged bleeding following surgeries or cuts? No   9. Have you ever had problems with anemia or been told to take iron pills? No   10. Have you had any abnormal blood loss such as black, tarry or bloody stools? No   11. Have you ever had a blood transfusion? No   12. Are you willing to have a blood transfusion if it is medically needed before, during, or after your surgery? Yes   13. Have you or any of your relatives ever had problems with anesthesia? No   14. Do you have sleep apnea, excessive snoring or daytime drowsiness? YES - on cpap   14a. Do you have a CPAP machine? Yes   15. Do you have any artifical heart valves or other implanted medical devices like a pacemaker, defibrillator, or continuous glucose monitor? No   16. Do you have artificial joints? No   17. Are you allergic to latex? No         Preoperative Review of    reviewed - no record of controlled substances prescribed.          Patient Active Problem List     "Diagnosis Date Noted    ZENIA (obstructive sleep apnea) 06/08/2022     Priority: Medium    Prediabetes 02/08/2019     Priority: Medium    Hyperlipidemia LDL goal <160 02/08/2019     Priority: Medium      No past medical history on file.  No past surgical history on file.  Current Outpatient Medications   Medication Sig Dispense Refill    aspirin (ASA) 81 MG tablet Take 81 mg by mouth      fish oil-omega-3 fatty acids 500 MG capsule Take by mouth daily      simvastatin (ZOCOR) 40 MG tablet TAKE ONE TABLET BY MOUTH EVERY NIGHT AT BEDTIME 90 tablet 11    cetirizine (ZYRTEC) 10 MG tablet Take 1 tablet (10 mg) by mouth daily as needed for allergies (Patient not taking: Reported on 4/3/2024) 90 tablet 11    fluticasone (FLONASE) 50 MCG/ACT nasal spray USE 2 SPRAYS INTO EACH NOSTRIL DAILY (Patient not taking: Reported on 4/3/2024) 16 g 3    olopatadine (PATANOL) 0.1 % ophthalmic solution Place 1 drop into both eyes 2 times daily (Patient not taking: Reported on 4/3/2024) 5 mL 11       No Known Allergies     Social History     Tobacco Use    Smoking status: Never    Smokeless tobacco: Current   Substance Use Topics    Alcohol use: Yes     Comment: Social       History   Drug Use No         Review of Systems    Review of Systems  Constitutional, neuro, ENT, endocrine, pulmonary, cardiac, gastrointestinal, genitourinary, musculoskeletal, integument and psychiatric systems are negative, except as otherwise noted.    Objective    /72 (BP Location: Right arm, Patient Position: Sitting, Cuff Size: Adult Regular)   Pulse 72   Temp 97.4  F (36.3  C) (Tympanic)   Resp 16   Ht 1.651 m (5' 5\")   Wt 72.1 kg (159 lb)   SpO2 100%   BMI 26.46 kg/m     Estimated body mass index is 26.46 kg/m  as calculated from the following:    Height as of this encounter: 1.651 m (5' 5\").    Weight as of this encounter: 72.1 kg (159 lb).  Physical Exam  GENERAL: alert and no distress  EYES: Eyes grossly normal to inspection, PERRL and " conjunctivae and sclerae normal  HENT: ear canals and TM's normal, nose and mouth without ulcers or lesions  NECK: no adenopathy, no asymmetry, masses, or scars  RESP: lungs clear to auscultation - no rales, rhonchi or wheezes  CV: regular rate and rhythm, normal S1 S2, no S3 or S4, no murmur, click or rub, no peripheral edema  ABDOMEN: soft, nontender, no hepatosplenomegaly, no masses and bowel sounds normal  MS: no gross musculoskeletal defects noted, no edema  SKIN: no suspicious lesions or rashes  NEURO: Normal strength and tone, mentation intact and speech normal  PSYCH: mentation appears normal, affect normal/bright    Recent Labs   Lab Test 09/20/23  0857 06/08/22  0929    137   POTASSIUM 5.0 4.7   CR 0.73 0.69   A1C 6.4* 6.3*        Diagnostics     No EKG required, no history of coronary heart disease, significant arrhythmia, peripheral arterial disease or other structural heart disease.    Revised Cardiac Risk Index (RCRI)  The patient has the following serious cardiovascular risks for perioperative complications:   - No serious cardiac risks = 0 points     RCRI Interpretation: 0 points: Class I (very low risk - 0.4% complication rate)         Signed Electronically by: Junior Ge MD  Copy of this evaluation report is provided to requesting physician.

## 2024-04-03 NOTE — TELEPHONE ENCOUNTER
Attempted to phone patient to review surgery instructions for upcoming left knee scope with medial meniscectomy DOS 4/23/24 with Dr. Hazel.  No answer during call, VM left with call back instructions.  [employer] message sent as well.    Post surgical physical therapy referral entered per Dr. Hazel's instructions.    Tscope brace ordered and coordinated with Orthotics team.    [facility] referral coordinated with [facility].    Kari Umanozr RN on 4/3/2024 at 10:46 AM

## 2024-04-03 NOTE — PATIENT INSTRUCTIONS
Preparing for Your Surgery  Getting started  A nurse will call you to review your health history and instructions. They will give you an arrival time based on your scheduled surgery time. Please be ready to share:  Your doctor's clinic name and phone number  Your medical, surgical, and anesthesia history  A list of allergies and sensitivities  A list of medicines, including herbal treatments and over-the-counter drugs  Whether the patient has a legal guardian (ask how to send us the papers in advance)  Please tell us if you're pregnant--or if there's any chance you might be pregnant. Some surgeries may injure a fetus (unborn baby), so they require a pregnancy test. Surgeries that are safe for a fetus don't always need a test, and you can choose whether to have one.   If you have a child who's having surgery, please ask for a copy of Preparing for Your Child's Surgery.    Preparing for surgery  Within 10 to 30 days of surgery: Have a pre-op exam (sometimes called an H&P, or History and Physical). This can be done at a clinic or pre-operative center.  If you're having a , you may not need this exam. Talk to your care team.  At your pre-op exam, talk to your care team about all medicines you take. If you need to stop any medicines before surgery, ask when to start taking them again.  We do this for your safety. Many medicines can make you bleed too much during surgery. Some change how well surgery (anesthesia) drugs work.  Call your insurance company to let them know you're having surgery. (If you don't have insurance, call [phone].)  Call your clinic if there's any change in your health. This includes signs of a cold or flu (sore throat, runny nose, cough, rash, fever). It also includes a scrape or scratch near the surgery site.  If you have questions on the day of surgery, call your hospital or surgery center.  Eating and drinking guidelines  For your safety: Unless your surgeon tells you otherwise,  follow the guidelines below.  Eat and drink as usual until 8 hours before you arrive for surgery. After that, no food or milk.  Drink clear liquids until 2 hours before you arrive. These are liquids you can see through, like water, Gatorade, and Propel Water. They also include plain black coffee and tea (no cream or milk), candy, and breath mints. You can spit out gum when you arrive.  If you drink alcohol: Stop drinking it the night before surgery.  If your care team tells you to take medicine on the morning of surgery, it's okay to take it with a sip of water.  Preventing infection  Shower or bathe the night before and morning of your surgery. Follow the instructions your clinic gave you. (If no instructions, use regular soap.)  Don't shave or clip hair near your surgery site. We'll remove the hair if needed.  Don't smoke or vape the morning of surgery. You may chew nicotine gum up to 2 hours before surgery. A nicotine patch is okay.  Note: Some surgeries require you to completely quit smoking and nicotine. Check with your surgeon.  Your care team will make every effort to keep you safe from infection. We will:  Clean our hands often with soap and water (or an alcohol-based hand rub).  Clean the skin at your surgery site with a special soap that kills germs.  Give you a special gown to keep you warm. (Cold raises the risk of infection.)  Wear special hair covers, masks, gowns and gloves during surgery.  Give antibiotic medicine, if prescribed. Not all surgeries need antibiotics.  What to bring on the day of surgery  Photo ID and insurance card  Copy of your health care directive, if you have one  Glasses and hearing aids (bring cases)  You can't wear contacts during surgery  Inhaler and eye drops, if you use them (tell us about these when you arrive)  CPAP machine or breathing device, if you use them  A few personal items, if spending the night  If you have . . .  A pacemaker, ICD (cardiac defibrillator) or other  implant: Bring the ID card.  An implanted stimulator: Bring the remote control.  A legal guardian: Bring a copy of the certified (court-stamped) guardianship papers.  Please remove any jewelry, including body piercings. Leave jewelry and other valuables at home.  If you're going home the day of surgery  You must have a responsible adult drive you home. They should stay with you overnight as well.  If you don't have someone to stay with you, and you aren't safe to go home alone, we may keep you overnight. Insurance often won't pay for this.  After surgery  If it's hard to control your pain or you need more pain medicine, please call your surgeon's office.  Questions?   If you have any questions for your care team, list them here: _________________________________________________________________________________________________________________________________________________________________________ ____________________________________ ____________________________________ ____________________________________  For informational purposes only. Not to replace the advice of your health care provider. Copyright   2003, 2019 [employer]. All rights reserved. Clinically reviewed by Neha Hoyt MD. SMARTworks 807824 - REV 12/22.    How to Take Your Medication Before Surgery  Stop aspirin 1 week prior to procedure

## 2024-04-03 NOTE — TELEPHONE ENCOUNTER
Patient has been scheduled for surgery. Details are below.    Date of Surgery: 04/23/24    Approximate Arrival Time: SURGERY CENTER WILL CALL 3/4 DAYS PRIOR TO CONFIRM A TIME   Surgeon:  DR. DARREL LIVINGSTON     Procedure: ARTHROSCOPY, KNEE, WITH MEDIAL MENISCECTOMY (Left)    Location: [facility], [address] [place] [address] [id number] [id number]  Surgery Consult: NA  PreOp Physical: 04/03/24  PostOp: 05/08/24 & 06/07/24  Packet Mailed/MyChart Sent: YES  Added to [place]: YES    Spoke to: GEORGE

## 2024-04-04 LAB
ANION GAP SERPL CALCULATED.3IONS-SCNC: 9 MMOL/L (ref 7–15)
BUN SERPL-MCNC: 7.9 MG/DL (ref 8–23)
CALCIUM SERPL-MCNC: 8.9 MG/DL (ref 8.8–10.2)
CHLORIDE SERPL-SCNC: 101 MMOL/L (ref 98–107)
CREAT SERPL-MCNC: 0.76 MG/DL (ref 0.67–1.17)
DEPRECATED HCO3 PLAS-SCNC: 26 MMOL/L (ref 22–29)
EGFRCR SERPLBLD CKD-EPI 2021: >90 ML/MIN/1.73M2
GLUCOSE SERPL-MCNC: 176 MG/DL (ref 70–99)
POTASSIUM SERPL-SCNC: 4 MMOL/L (ref 3.4–5.3)
SODIUM SERPL-SCNC: 136 MMOL/L (ref 135–145)

## 2024-04-08 ENCOUNTER — TELEPHONE (OUTPATIENT)
Dept: ORTHOPEDICS | Facility: CLINIC | Age: 64
End: 2024-04-08
Payer: COMMERCIAL

## 2024-04-08 ENCOUNTER — MEDICAL CORRESPONDENCE (OUTPATIENT)
Dept: HEALTH INFORMATION MANAGEMENT | Facility: CLINIC | Age: 64
End: 2024-04-08
Payer: COMMERCIAL

## 2024-04-08 PROBLEM — E11.65 TYPE 2 DIABETES MELLITUS WITH HYPERGLYCEMIA (H): Status: ACTIVE | Noted: 2024-04-08

## 2024-04-08 PROBLEM — E78.2 MIXED HYPERLIPIDEMIA: Status: ACTIVE | Noted: 2024-04-08

## 2024-04-08 NOTE — TELEPHONE ENCOUNTER
[facility] cold therapy order received via fax. Order reviewed and signed by provider, then faxed to the [employer]. Document sent to scan.     Nathalia Cross MSA, ATC  Certified Athletic Trainer

## 2024-04-17 ENCOUNTER — VIRTUAL VISIT (OUTPATIENT)
Dept: EDUCATION SERVICES | Facility: CLINIC | Age: 64
End: 2024-04-17
Attending: INTERNAL MEDICINE
Payer: COMMERCIAL

## 2024-04-17 DIAGNOSIS — R73.9 HYPERGLYCEMIA: ICD-10-CM

## 2024-04-17 DIAGNOSIS — E11.65 TYPE 2 DIABETES MELLITUS WITH HYPERGLYCEMIA, UNSPECIFIED WHETHER LONG TERM INSULIN USE (H): ICD-10-CM

## 2024-04-17 PROCEDURE — 97802 MEDICAL NUTRITION INDIV IN: CPT | Mod: 93 | Performed by: DIETITIAN, REGISTERED

## 2024-04-17 NOTE — PATIENT INSTRUCTIONS
Thank you for visiting me today regarding your new diagnosis of type 2 diabetes     You can control diabetes  You can prevent or delay the progression of diabetes with healthy lifestyle choices.  1.  Exercise 30 minutes a day, at least five times per week (or 150 minutes of exercise per week).    2.  Lose weight if you need to. If you are overweight, losing just 5 to 10% of your body weight (an  average of 15 pounds) may reduce your risk.    3. Cut back calories in your diet. Choose healthy, nutritious, and unprocessed foods (lean meats, plant proteins, whole grains, fruits and vegetables).    4.  Recheck A1C in the next 3-6 months.    5. You may need medication to delay the progression of diabetes. Continue to follow up with your diabetes educator or primary care provider.    6. If you are interested in starting to check blood sugars with a glucometer please reach out.    7. You are welcome to schedule a follow up with me (your diabetes educator)  anytime by calling the triage/scheduling line to help with lifestyle changes    Healthy Eating:  Plate method eating: fill half your plate with non-starchy vegetables, 1/4 plate with lean protein, 1/4 plate with carbohydrates (think whole grains/fruit/starchy vegetables/bean or legumes). Check out this web page for more details.  https://www.diabetesfoodhub.org/articles/what-is-the-diabetes-plate-method.html  ChooseGigaLogixplate.gov    2. Try to eat whole foods as much as possible, these are more nutritious with vitamins/minerals and fiber. Look out for added sugar, read nutrition labels and look at the grams of added sugar. Goal is 25g per day or less for women and 35g per day or less for men.    3. Only eat when you are truly hungry and choose whole foods as much as possible. Save sweets and other treats for special occassions.     4. Check out this website for TONS of healthy recipes:  https://www.diabetesfoodhub.org/all-recipes.html    Choosemyplate.gov    Snacks  Try to  limit snacking to only when you are truly hungry (not tired, thirsty ,or bored). Here are some snack ideas.. Adding protein or small amount of healthy fat to a snack helps you to feel full longer and eat less. Choose whole grains when able. Also remember non-starchy vegetables are a fantastic snack, add some hummus or dip to go with it.   - Apple or small banana  or celery/carrots with 1-2 tbs nut butter  - 1/2 cup cottage cheese with peach or pear (size of tennis ball)  - 1/2 cup tuna w/mccray OR 1/2 avocado/guacamole on whole wheat toast OR tim bread OR cut up veggies  - 1/4 cup hummus (and salsa if desired) with ~10 tortilla chips  - 1 cup plain greek yogurt with 1 cup berries  - 4-6oz flavored greek yogurt (look for lower added sugar)  - 1/4 cup nuts or trail mix  - 3 cups popcorn  -1+ cup edamame       Thank you!  Talia Mei RDN, LD, [facility]   Certified Diabetes Care &   [phone]

## 2024-04-17 NOTE — PROGRESS NOTES
Diabetes Self-Management Education & Support    Presents for: Initial Assessment for new diagnosis    Type of Service: Telephone Visit    Originating Location (Patient Location): Home  Distant Location (Provider Location): Offsite  Mode of Communication:  Telephone    Telephone Visit Start Time:  10:15 AM  Telephone Visit End Time (telephone visit stop time): 10:32 AM    How would patient like to obtain AVS? MyChart      ASSESSMENT:  Reviewed diabetes pathophysiology, BG goals, A1C goals and importance of making lifestyle changes. Carbohydrates reviewed and label reading along with the benefits of activity. Discussed use of glucose meter - patient prefers to wait to start checking if A1C rises.   Spoke with patient and his wife. Explained new diagnosis of diabetes and ways to manage with lifestyle. Activity is limited at this time with his knee, but he has surgery this month. Hoping to increase activity as able after surgery.    Encouraged balanced diet with lower carbohydrates - aiming for 45 grams (3) carbohydrate/meal with adequate protein and vegetables. Carbohydrate 15 gram = 1 serving reviewed.       Patient's most recent   Lab Results   Component Value Date    A1C 6.6 04/03/2024    A1C 6.0 04/21/2021     is meeting goal of <7.0    Diabetes knowledge and skills assessment:   Patient is knowledgeable in diabetes management concepts related to: Healthy Eating and Healthy Coping    Continue education with the following diabetes management concepts: Healthy Eating, Being Active, and Monitoring    Based on learning assessment above, most appropriate setting for further diabetes education would be: Individual setting.      PLAN  Continue with lifestyle modifications  Recheck A1C in 3-6 months  Topics to cover at upcoming visits: Healthy Eating, Being Active, and Monitoring    Follow-up: 3-6 months    See Care Plan for co-developed, patient-state behavior change goals.  AVS provided for patient today.    Education  "Materials Provided:  [employer] Healthy Living with Diabetes Book, My Plate Planner, and will mail to patient      SUBJECTIVE/OBJECTIVE:  Presents for: Initial Assessment for new diagnosis  Accompanied by: Self, Spouse  Diabetes education in the past 24mo: Yes  Focus of Visit: Monitoring  Diabetes type: Type 2  Date of diagnosis: April 2024  Disease course: Stable  Other concerns:: None  Cultural Influences/Ethnic Background:  Not  or       Diabetes Symptoms & Complications:  Disease course: Stable  Complications assessed today?: No    Patient Problem List and Family Medical History reviewed for relevant medical history, current medical status, and diabetes risk factors.    Vitals:  There were no vitals taken for this visit.  Estimated body mass index is 26.46 kg/m  as calculated from the following:    Height as of 4/3/24: 1.651 m (5' 5\").    Weight as of 4/3/24: 72.1 kg (159 lb).   Last 3 BP:   BP Readings from Last 3 Encounters:   04/03/24 124/72   10/19/23 123/83   09/20/23 110/72       History   Smoking Status    Never   Smokeless Tobacco    Current       Labs:  Lab Results   Component Value Date    A1C 6.6 04/03/2024    A1C 6.0 04/21/2021     Lab Results   Component Value Date     04/03/2024     06/08/2022    GLC 94 04/21/2021     Lab Results   Component Value Date    LDL 67 09/20/2023    LDL 66 04/21/2021     HDL Cholesterol   Date Value Ref Range Status   04/21/2021 50 >39 mg/dL Final     Direct Measure HDL   Date Value Ref Range Status   09/20/2023 77 >=40 mg/dL Final   ]  GFR Estimate   Date Value Ref Range Status   04/03/2024 >90 >60 mL/min/1.73m2 Final   04/21/2021 >90 >60 mL/min/[1.73_m2] Final     Comment:     Non  GFR Calc  Starting 12/18/2018, serum creatinine based estimated GFR (eGFR) will be   calculated using the Chronic Kidney Disease Epidemiology Collaboration   (CKD-EPI) equation.       GFR Estimate If Black   Date Value Ref Range Status " "  04/21/2021 >90 >60 mL/min/[1.73_m2] Final     Comment:      GFR Calc  Starting 12/18/2018, serum creatinine based estimated GFR (eGFR) will be   calculated using the Chronic Kidney Disease Epidemiology Collaboration   (CKD-EPI) equation.       Lab Results   Component Value Date    CR 0.76 04/03/2024    CR 0.79 04/21/2021     No results found for: \"MICROALBUMIN\"    Healthy Eating:  Healthy Eating Assessed Today: Yes  Meal planning/habits: None  Who cooks/prepares meals for you?: Self, Spouse  Who purchases food in  your home?: Self, Spouse  Meals include: Breakfast, Dinner  Breakfast: cup of tea with milk and sugar. Rice or (wheat flour item). (chickpeas or nuts)  Lunch: snack: cooked vegetables with cooked rice.  Dinner: rice once a week (wheat flour item). (chickpeas or nuts) with beans/lentils  Snacks: fruit fresh  Other: Vegetarian  Beverages: Water, Tea, Alcohol  Has patient met with a dietitian in the past?: No    Being Active:  Being Active Assessed Today: No  Exercise:: Currently not exercising  Barrier to exercise: Time    Monitoring:  Monitoring Assessed Today: No  Did patient bring glucose meter to appointment? : No      Taking Medications:      Taking Medication Assessed Today: Yes  Current Treatments: None    Problem Solving:  Problem Solving Assessed Today: Yes       Reducing Risks:  Reducing Risks Assessed Today: Yes  Diabetes Risks: Age over 45 years, Ethnicity, Sedentary Lifestyle, Family History  CAD Risks: Diabetes Mellitus, Male sex    Healthy Coping:  Healthy Coping Assessed Today: Yes  Emotional response to diabetes: Ready to learn  Informal Support system:: None  Stage of change: ACTION (Actively working towards change)  Patient Activation Measure Survey Score:       No data to display                  Care Plan and Education Provided:  Care Plan: Diabetes   Updates made by Talia Mei RD since 4/17/2024 12:00 AM        Problem: HbA1C Not In Goal         Goal: " Establish Regular Follow-Ups with PCP         Task: Discuss with PCP the recommended timing for patient's next follow up visit(s)    Responsible User: Talia Mei RD        Task: Discuss schedule for PCP visits with patient    Responsible User: Talia Mei RD        Goal: Get HbA1C Level in Goal         Task: Educate patient on diabetes education self-management topics    Responsible User: Talia Mei RD        Task: Educate patient on benefits of regular glucose monitoring    Responsible User: Talia Mei RD        Task: Refer patient to appropriate extended care team member, as needed (Medication Therapy Management, Behavioral Health, Physical Therapy, etc.)    Responsible User: Talia Mei RD        Task: Discuss diabetes treatment plan with patient    Responsible User: Talia Mei RD        Problem: Diabetes Self-Management Education Needed to Optimize Self-Care Behaviors         Goal: Understand diabetes pathophysiology and disease progression         Task: Provide education on diabetes pathophysiology and disease progression specfic to patient's diabetes type Completed 4/17/2024   Responsible User: Talia Mei RD        Goal: Healthy Eating - follow a healthy eating pattern for diabetes    This Visit's Progress: 70%   Note:    Aim for 3 servings of carbs at a meal       Task: Provide education on portion control and consistency in amount, composition and timing of food intake Completed 4/17/2024   Responsible User: Talia Mei RD        Task: Provide education on managing carbohydrate intake (carbohydrate counting, plate planning method, etc.) Completed 4/17/2024   Responsible User: Talia Mei RD        Task: Provide education on weight management    Responsible User: Talia Mei RD        Task: Provide education on heart healthy eating    Responsible User: Talia Mei RD        Task: Provide education on eating  out    Responsible User: Talia Mei RD        Task: Develop individualized healthy eating plan with patient    Responsible User: Talia Mei RD        Goal: Being Active - get regular physical activity, working up to at least 150 minutes per week         Task: Provide education on relationship of activity to glucose and precautions to take if at risk for low glucose    Responsible User: Talia Mei RD        Task: Discuss barriers to physical activity with patient    Responsible User: Talia Mei RD        Task: Develop physical activity plan with patient    Responsible User: Talia Mei RD        Task: Explore community resources including walking groups, assistance programs, and home videos    Responsible User: Talia Mei RD        Goal: Monitoring - monitor glucose and ketones as directed         Task: Provide education on blood glucose monitoring (purpose, proper technique, frequency, glucose targets, interpreting results, when to use glucose control solution, sharps disposal)    Responsible User: Talia Mei RD        Task: Provide education on continuous glucose monitoring (sensor placement, use of usama or /reader, understanding glucose trends, alerts and alarms, differences between sensor glucose and blood glucose)    Responsible User: Talia Mei RD        Task: Provide education on ketone monitoring (when to monitor, frequency, etc.)    Responsible User: Talia Mei RD        Goal: Taking Medication - patient is consistently taking medications as directed         Task: Provide education on action of prescribed medication, including when to take and possible side effects    Responsible User: Talia Mei RD        Task: Provide education on insulin and injectable diabetes medications, including administration, storage, site selection and rotation for injection sites    Responsible User: Talia Mei RD         Task: Discuss barriers to medication adherence with patient and provide management technique ideas as appropriate    Responsible User: Talia Mei RD        Task: Provide education on frequency and refill details of medications    Responsible User: Talia Mei RD        Goal: Problem Solving - know how to prevent and manage short-term diabetes complications         Task: Provide education on high blood glucose - causes, signs/symptoms, prevention and treatment    Responsible User: Talia Mei RD        Task: Provide education on low blood glucose - causes, signs/symptoms, prevention, treatment, carrying a carbohydrate source at all times, and medical identification    Responsible User: Talia Mei RD        Task: Provide education on safe travel with diabetes    Responsible User: Talia Mei RD        Task: Provide education on how to care for diabetes on sick days    Responsible User: Talia Mei RD        Task: Provide education on when to call a health care provider    Responsible User: Talia Mei RD        Goal: Reducing Risks - know how to prevent and treat long-term diabetes complications         Task: Provide education on major complications of diabetes, prevention, early diagnostic measures and treatment of complications    Responsible User: Talia Mei RD        Task: Provide education on recommended care for dental, eye and foot health    Responsible User: Talia Mei RD        Task: Provide education on Hemoglobin A1c - goals and relationship to blood glucose levels Completed 4/17/2024   Responsible User: Talia Mei RD        Task: Provide education on recommendations for heart health - lipid levels and goals, blood pressure and goals, and aspirin therapy, if indicated    Responsible User: Talia Mei RD        Task: Provide education on tobacco cessation    Responsible User: Talia Mei RD         Goal: Healthy Coping - use available resources to cope with the challenges of managing diabetes         Task: Discuss recognizing feelings about having diabetes Completed 4/17/2024   Responsible User: Talia Mei RD        Task: Provide education on the benefits of making appropriate lifestyle changes    Responsible User: Talia Mei RD        Task: Provide education on benefits of utilizing support systems    Responsible User: Talia Mei RD        Task: Discuss methods for coping with stress    Responsible User: Talia Mei RD        Task: Provide education on when to seek professional counseling    Responsible User: Talia Mei RD            Time Spent: 17 minutes  Encounter Type: Individual    Any diabetes medication dose changes were made via the CDE Protocol per the patient's referring provider. A copy of this encounter was shared with the provider.

## 2024-04-17 NOTE — LETTER
4/17/2024         RE: Walt Yin  [address] [place] [address]        Dear Colleague,    Thank you for referring your patient, Walt Yin, to the [facility]. Please see a copy of my visit note below.    Diabetes Self-Management Education & Support    Presents for: Initial Assessment for new diagnosis    Type of Service: Telephone Visit    Originating Location (Patient Location): Home  Distant Location (Provider Location): Offsite  Mode of Communication:  Telephone    Telephone Visit Start Time:  10:15 AM  Telephone Visit End Time (telephone visit stop time): 10:32 AM    How would patient like to obtain AVS? MyChart      ASSESSMENT:  Reviewed diabetes pathophysiology, BG goals, A1C goals and importance of making lifestyle changes. Carbohydrates reviewed and label reading along with the benefits of activity. Discussed use of glucose meter - patient prefers to wait to start checking if A1C rises.   Spoke with patient and his wife. Explained new diagnosis of diabetes and ways to manage with lifestyle. Activity is limited at this time with his knee, but he has surgery this month. Hoping to increase activity as able after surgery.    Encouraged balanced diet with lower carbohydrates - aiming for 45 grams (3) carbohydrate/meal with adequate protein and vegetables. Carbohydrate 15 gram = 1 serving reviewed.       Patient's most recent   Lab Results   Component Value Date    A1C 6.6 04/03/2024    A1C 6.0 04/21/2021     is meeting goal of <7.0    Diabetes knowledge and skills assessment:   Patient is knowledgeable in diabetes management concepts related to: Healthy Eating and Healthy Coping    Continue education with the following diabetes management concepts: Healthy Eating, Being Active, and Monitoring    Based on learning assessment above, most appropriate setting for further diabetes education would be: Individual setting.      PLAN  Continue with lifestyle  "modifications  Recheck A1C in 3-6 months  Topics to cover at upcoming visits: Healthy Eating, Being Active, and Monitoring    Follow-up: 3-6 months    See Care Plan for co-developed, patient-state behavior change goals.  AVS provided for patient today.    Education Materials Provided:  [employer] Healthy Living with Diabetes Book, My Plate Planner, and will mail to patient      SUBJECTIVE/OBJECTIVE:  Presents for: Initial Assessment for new diagnosis  Accompanied by: Self, Spouse  Diabetes education in the past 24mo: Yes  Focus of Visit: Monitoring  Diabetes type: Type 2  Date of diagnosis: April 2024  Disease course: Stable  Other concerns:: None  Cultural Influences/Ethnic Background:  Not  or       Diabetes Symptoms & Complications:  Disease course: Stable  Complications assessed today?: No    Patient Problem List and Family Medical History reviewed for relevant medical history, current medical status, and diabetes risk factors.    Vitals:  There were no vitals taken for this visit.  Estimated body mass index is 26.46 kg/m  as calculated from the following:    Height as of 4/3/24: 1.651 m (5' 5\").    Weight as of 4/3/24: 72.1 kg (159 lb).   Last 3 BP:   BP Readings from Last 3 Encounters:   04/03/24 124/72   10/19/23 123/83   09/20/23 110/72       History   Smoking Status     Never   Smokeless Tobacco     Current       Labs:  Lab Results   Component Value Date    A1C 6.6 04/03/2024    A1C 6.0 04/21/2021     Lab Results   Component Value Date     04/03/2024     06/08/2022    GLC 94 04/21/2021     Lab Results   Component Value Date    LDL 67 09/20/2023    LDL 66 04/21/2021     HDL Cholesterol   Date Value Ref Range Status   04/21/2021 50 >39 mg/dL Final     Direct Measure HDL   Date Value Ref Range Status   09/20/2023 77 >=40 mg/dL Final   ]  GFR Estimate   Date Value Ref Range Status   04/03/2024 >90 >60 mL/min/1.73m2 Final   04/21/2021 >90 >60 mL/min/[1.73_m2] Final     Comment: " "    Non  GFR Calc  Starting 12/18/2018, serum creatinine based estimated GFR (eGFR) will be   calculated using the Chronic Kidney Disease Epidemiology Collaboration   (CKD-EPI) equation.       GFR Estimate If Black   Date Value Ref Range Status   04/21/2021 >90 >60 mL/min/[1.73_m2] Final     Comment:      GFR Calc  Starting 12/18/2018, serum creatinine based estimated GFR (eGFR) will be   calculated using the Chronic Kidney Disease Epidemiology Collaboration   (CKD-EPI) equation.       Lab Results   Component Value Date    CR 0.76 04/03/2024    CR 0.79 04/21/2021     No results found for: \"MICROALBUMIN\"    Healthy Eating:  Healthy Eating Assessed Today: Yes  Meal planning/habits: None  Who cooks/prepares meals for you?: Self, Spouse  Who purchases food in  your home?: Self, Spouse  Meals include: Breakfast, Dinner  Breakfast: cup of tea with milk and sugar. Rice or (wheat flour item). (chickpeas or nuts)  Lunch: snack: cooked vegetables with cooked rice.  Dinner: rice once a week (wheat flour item). (chickpeas or nuts) with beans/lentils  Snacks: fruit fresh  Other: Vegetarian  Beverages: Water, Tea, Alcohol  Has patient met with a dietitian in the past?: No    Being Active:  Being Active Assessed Today: No  Exercise:: Currently not exercising  Barrier to exercise: Time    Monitoring:  Monitoring Assessed Today: No  Did patient bring glucose meter to appointment? : No      Taking Medications:      Taking Medication Assessed Today: Yes  Current Treatments: None    Problem Solving:  Problem Solving Assessed Today: Yes       Reducing Risks:  Reducing Risks Assessed Today: Yes  Diabetes Risks: Age over 45 years, Ethnicity, Sedentary Lifestyle, Family History  CAD Risks: Diabetes Mellitus, Male sex    Healthy Coping:  Healthy Coping Assessed Today: Yes  Emotional response to diabetes: Ready to learn  Informal Support system:: None  Stage of change: ACTION (Actively working towards " change)  Patient Activation Measure Survey Score:       No data to display                  Care Plan and Education Provided:  Care Plan: Diabetes   Updates made by Talia Mei RD since 4/17/2024 12:00 AM        Problem: HbA1C Not In Goal         Goal: Establish Regular Follow-Ups with PCP         Task: Discuss with PCP the recommended timing for patient's next follow up visit(s)    Responsible User: Talia Mei RD        Task: Discuss schedule for PCP visits with patient    Responsible User: Talia Mei RD        Goal: Get HbA1C Level in Goal         Task: Educate patient on diabetes education self-management topics    Responsible User: Talia Mei RD        Task: Educate patient on benefits of regular glucose monitoring    Responsible User: Talia Mei RD        Task: Refer patient to appropriate extended care team member, as needed (Medication Therapy Management, Behavioral Health, Physical Therapy, etc.)    Responsible User: Talia Mei RD        Task: Discuss diabetes treatment plan with patient    Responsible User: Talia Mei RD        Problem: Diabetes Self-Management Education Needed to Optimize Self-Care Behaviors         Goal: Understand diabetes pathophysiology and disease progression         Task: Provide education on diabetes pathophysiology and disease progression specfic to patient's diabetes type Completed 4/17/2024   Responsible User: Talia Mei RD        Goal: Healthy Eating - follow a healthy eating pattern for diabetes    This Visit's Progress: 70%   Note:    Aim for 3 servings of carbs at a meal       Task: Provide education on portion control and consistency in amount, composition and timing of food intake Completed 4/17/2024   Responsible User: Talia Mei RD        Task: Provide education on managing carbohydrate intake (carbohydrate counting, plate planning method, etc.) Completed 4/17/2024   Responsible User:  Talia Mei RD        Task: Provide education on weight management    Responsible User: Talia Mei RD        Task: Provide education on heart healthy eating    Responsible User: Talia Mei RD        Task: Provide education on eating out    Responsible User: Talia Mei RD        Task: Develop individualized healthy eating plan with patient    Responsible User: Talia Mei RD        Goal: Being Active - get regular physical activity, working up to at least 150 minutes per week         Task: Provide education on relationship of activity to glucose and precautions to take if at risk for low glucose    Responsible User: Talia Mei RD        Task: Discuss barriers to physical activity with patient    Responsible User: Talia Mei RD        Task: Develop physical activity plan with patient    Responsible User: Talia Mei RD        Task: Explore community resources including walking groups, assistance programs, and home videos    Responsible User: Talia Mei RD        Goal: Monitoring - monitor glucose and ketones as directed         Task: Provide education on blood glucose monitoring (purpose, proper technique, frequency, glucose targets, interpreting results, when to use glucose control solution, sharps disposal)    Responsible User: Talia Mei RD        Task: Provide education on continuous glucose monitoring (sensor placement, use of usama or /reader, understanding glucose trends, alerts and alarms, differences between sensor glucose and blood glucose)    Responsible User: Talia Mei RD        Task: Provide education on ketone monitoring (when to monitor, frequency, etc.)    Responsible User: Talia Mei RD        Goal: Taking Medication - patient is consistently taking medications as directed         Task: Provide education on action of prescribed medication, including when to take and possible side  effects    Responsible User: Talia Mei RD        Task: Provide education on insulin and injectable diabetes medications, including administration, storage, site selection and rotation for injection sites    Responsible User: Talia Mei RD        Task: Discuss barriers to medication adherence with patient and provide management technique ideas as appropriate    Responsible User: Talia Mei RD        Task: Provide education on frequency and refill details of medications    Responsible User: Talia Mei RD        Goal: Problem Solving - know how to prevent and manage short-term diabetes complications         Task: Provide education on high blood glucose - causes, signs/symptoms, prevention and treatment    Responsible User: Talia Mei RD        Task: Provide education on low blood glucose - causes, signs/symptoms, prevention, treatment, carrying a carbohydrate source at all times, and medical identification    Responsible User: Talia Mei RD        Task: Provide education on safe travel with diabetes    Responsible User: Talia Mei RD        Task: Provide education on how to care for diabetes on sick days    Responsible User: Talia Mei RD        Task: Provide education on when to call a health care provider    Responsible User: Talia Mei RD        Goal: Reducing Risks - know how to prevent and treat long-term diabetes complications         Task: Provide education on major complications of diabetes, prevention, early diagnostic measures and treatment of complications    Responsible User: Talia Mei RD        Task: Provide education on recommended care for dental, eye and foot health    Responsible User: Talia Mei RD        Task: Provide education on Hemoglobin A1c - goals and relationship to blood glucose levels Completed 4/17/2024   Responsible User: Talia Mei RD        Task: Provide education on  recommendations for heart health - lipid levels and goals, blood pressure and goals, and aspirin therapy, if indicated    Responsible User: Talia Mei RD        Task: Provide education on tobacco cessation    Responsible User: Talia Mei RD        Goal: Healthy Coping - use available resources to cope with the challenges of managing diabetes         Task: Discuss recognizing feelings about having diabetes Completed 4/17/2024   Responsible User: Talia Mei RD        Task: Provide education on the benefits of making appropriate lifestyle changes    Responsible User: Talia Mei RD        Task: Provide education on benefits of utilizing support systems    Responsible User: Talia Mei RD        Task: Discuss methods for coping with stress    Responsible User: Talia Mei RD        Task: Provide education on when to seek professional counseling    Responsible User: Talia Mei RD            Time Spent: 17 minutes  Encounter Type: Individual    Any diabetes medication dose changes were made via the CDE Protocol per the patient's referring provider. A copy of this encounter was shared with the provider.

## 2024-04-23 DIAGNOSIS — S83.207D TEAR OF LEFT MENISCUS AS CURRENT INJURY, SUBSEQUENT ENCOUNTER: Primary | ICD-10-CM

## 2024-04-23 DIAGNOSIS — Z98.890 S/P LEFT KNEE ARTHROSCOPY: ICD-10-CM

## 2024-04-30 ENCOUNTER — THERAPY VISIT (OUTPATIENT)
Dept: PHYSICAL THERAPY | Facility: CLINIC | Age: 64
End: 2024-04-30
Attending: STUDENT IN AN ORGANIZED HEALTH CARE EDUCATION/TRAINING PROGRAM
Payer: COMMERCIAL

## 2024-04-30 DIAGNOSIS — M25.562 ACUTE PAIN OF LEFT KNEE: Primary | ICD-10-CM

## 2024-04-30 DIAGNOSIS — S83.242D OTHER TEAR OF MEDIAL MENISCUS OF LEFT KNEE AS CURRENT INJURY, SUBSEQUENT ENCOUNTER: ICD-10-CM

## 2024-04-30 DIAGNOSIS — Z98.890 S/P ARTHROSCOPY OF LEFT KNEE: ICD-10-CM

## 2024-04-30 PROCEDURE — 97161 PT EVAL LOW COMPLEX 20 MIN: CPT | Mod: GP | Performed by: PHYSICAL THERAPIST

## 2024-04-30 PROCEDURE — 97110 THERAPEUTIC EXERCISES: CPT | Mod: GP | Performed by: PHYSICAL THERAPIST

## 2024-04-30 ASSESSMENT — ACTIVITIES OF DAILY LIVING (ADL)
RISE FROM A CHAIR: I AM UNABLE TO DO THE ACTIVITY
KNEE_ACTIVITY_OF_DAILY_LIVING_SUM: 28
HOW_WOULD_YOU_RATE_THE_CURRENT_FUNCTION_OF_YOUR_KNEE_DURING_YOUR_USUAL_DAILY_ACTIVITIES_ON_A_SCALE_FROM_0_TO_100_WITH_100_BEING_YOUR_LEVEL_OF_KNEE_FUNCTION_PRIOR_TO_YOUR_INJURY_AND_0_BEING_THE_INABILITY_TO_PERFORM_ANY_OF_YOUR_USUAL_DAILY_ACTIVITIES?: 50
KNEEL ON THE FRONT OF YOUR KNEE: ACTIVITY IS SOMEWHAT DIFFICULT
SIT WITH YOUR KNEE BENT: I AM UNABLE TO DO THE ACTIVITY
GO UP STAIRS: ACTIVITY IS FAIRLY DIFFICULT
SQUAT: I AM UNABLE TO DO THE ACTIVITY
PAIN: THE SYMPTOM AFFECTS MY ACTIVITY MODERATELY
STIFFNESS: THE SYMPTOM AFFECTS MY ACTIVITY MODERATELY
KNEE_ACTIVITY_OF_DAILY_LIVING_SCORE: 40
STAND: ACTIVITY IS MINIMALLY DIFFICULT
HOW_WOULD_YOU_RATE_THE_OVERALL_FUNCTION_OF_YOUR_KNEE_DURING_YOUR_USUAL_DAILY_ACTIVITIES?: ABNORMAL
AS_A_RESULT_OF_YOUR_KNEE_INJURY,_HOW_WOULD_YOU_RATE_YOUR_CURRENT_LEVEL_OF_DAILY_ACTIVITY?: ABNORMAL
GO DOWN STAIRS: ACTIVITY IS FAIRLY DIFFICULT
WEAKNESS: THE SYMPTOM AFFECTS MY ACTIVITY MODERATELY
RAW_SCORE: 28
WALK: ACTIVITY IS FAIRLY DIFFICULT
GIVING WAY, BUCKLING OR SHIFTING OF KNEE: I DO NOT HAVE THE SYMPTOM
SWELLING: THE SYMPTOM AFFECTS MY ACTIVITY MODERATELY
LIMPING: THE SYMPTOM AFFECTS MY ACTIVITY MODERATELY

## 2024-04-30 NOTE — PROGRESS NOTES
PHYSICAL THERAPY EVALUATION  Type of Visit: Evaluation    See electronic medical record for Abuse and Falls Screening details.    Subjective       Presenting condition or subjective complaint: knee surgery 4/23/2023  Date of onset: 04/23/24    Relevant medical history:     Dates & types of surgery: 4/23/2024 left knee    Prior diagnostic imaging/testing results: MRI; X-ray     Prior therapy history for the same diagnosis, illness or injury: No      Prior Level of Function  Transfers: Independent  Ambulation: Independent      Living Environment  Social support: With a significant other or spouse   Type of home:     Stairs to enter the home:         Ramp:     Stairs inside the home:         Help at home:    Equipment owned:       Employment: Yes   Hobbies/Interests: playing cricket    Patient goals for therapy: get back to normal walking strengthening         Objective   KNEE EVALUATION  PAIN: Pain Location: left knee in general  Pain Frequency: intermittent  Pain is Exacerbated By: walking, bending, activity in general  Pain is Relieved By: cold and rest  INTEGUMENTARY (edema, incisions):  moderate swelling left knee, color is good; stitches at scope site still present  GAIT:  Weightbearing Status: WBAT  Assistive Device(s): None  Gait Deviations: Antalgic  Stance time decreased  Decreased knee extension in stance phase; shifts more to right in standing  BALANCE/PROPRIOCEPTION:  not assessed today due to post op status will continue to assess as progresses    ROM:   (Degrees) Left AROM Left PROM  Right AROM Right PROM   Knee Flexion 60      Knee Extension 5      Pain:   pain end range of flexion and extension.  Able to improve to 75 degrees flexion with sitting knee flexion by end of session    STRENGTH:  fair quad set, improved with towel roll under knee; quad lag with SLR    FUNCTIONAL TESTS:  deferred due to post op status    Assessment & Plan   CLINICAL IMPRESSIONS  Medical Diagnosis: s/p arthroscopy  of left knee; partial medial meniscectomy and ACL debridement    Treatment Diagnosis: acute pain of left knee; aftercare s/p left knee arthroscopy   Impression/Assessment: Patient is a 63 year old male with left knee complaints.  The following significant findings have been identified: Pain, Decreased ROM/flexibility, Decreased joint mobility, Decreased strength, Impaired balance, Inflammation, Impaired gait, Impaired muscle performance, and Decreased activity tolerance. These impairments interfere with their ability to perform self care tasks, work tasks, recreational activities, household chores, driving , household mobility, and community mobility as compared to previous level of function.     Clinical Decision Making (Complexity):  Clinical Presentation: Stable/Uncomplicated  Clinical Presentation Rationale: based on medical and personal factors listed in PT evaluation  Clinical Decision Making (Complexity): Low complexity    PLAN OF CARE  Treatment Interventions:  Interventions: Gait Training, Manual Therapy, Neuromuscular Re-education, Therapeutic Activity, Therapeutic Exercise, Self-Care/Home Management    Long Term Goals     PT Goal 1  Goal Identifier: Ambulation  Goal Description: Minutes patient will be able to  walk; on uneven terrain; on sharp inclines/declines; Able to make sharp turns; Ambulate without gait deviation 30 min  Rationale: to maximize safety and independence with performance of ADLs and functional tasks;to maximize safety and independence within the home;to maximize safety and independence within the community;to maximize safety and independence with transportation;to maximize safety and independence with self cares  Target Date: 07/23/24  PT Goal 2  Goal Identifier: Stairs  Goal Description: Ascend/descend stairs; in a normal reciprocal pattern  Rationale: to maximize safety and independence with performance of ADLs and functional tasks;to maximize safety and independence within the  home;to maximize safety and independence within the community;to maximize safety and independence with transportation;to maximize safety and independence with self cares  Target Date: 07/23/24      Frequency of Treatment: 1/week x 8 weeks, then every 2 weeks x 4 weeks  Duration of Treatment: 12 weeks      Education Assessment:   Learner/Method: Patient;Pictures/Video;No Barriers to Learning  Education Comments: Educated pt on findings of the evaluation and reasoning for plan of care.  Pt was able to understand how treatment plan aligns with goals.    Risks and benefits of evaluation/treatment have been explained.   Patient/Family/caregiver agrees with Plan of Care.     Evaluation Time:     PT Eval, Low Complexity Minutes (09629): 15       Signing Clinician: Caryl Melton PT      [facility]                                                                                   OUTPATIENT PHYSICAL THERAPY      PLAN OF TREATMENT FOR OUTPATIENT REHABILITATION   Patient's Last Name, First Name, Walt Kim YOB: 1960   Provider's Name   [facility]   Medical Record No.  [id number]     Onset Date: 04/23/24  Start of Care Date:       Medical Diagnosis:  s/p arthroscopy of left knee; partial medial meniscectomy and ACL debridement      PT Treatment Diagnosis:  acute pain of left knee; aftercare s/p left knee arthroscopy Plan of Treatment  Frequency/Duration: 1/week x 8 weeks, then every 2 weeks x 4 weeks/ 12 weeks    Certification date from 04/30/24 to 07/23/24         See note for plan of treatment details and functional goals     Caryl Melton, PT                         I CERTIFY THE NEED FOR THESE SERVICES FURNISHED UNDER        THIS PLAN OF TREATMENT AND WHILE UNDER MY CARE     (Physician attestation of this document indicates review and certification of the therapy plan).              Referring Provider:  Drake Gonzales  Assessment  See Epic Evaluation-

## 2024-05-02 NOTE — PROGRESS NOTES
CC: 2-week status post left knee arthroscopic partial medial meniscectomy and ACL debridement    HPI: Patient is a 63-year-old male seen here today with his wife 2 weeks status post a left knee arthroscopic partial medial meniscectomy, ACL debridement, and notchplasty.  He has been doing physical therapy at our [place] location.  He still notes some effusion of the left knee.  He denies any erythema or drainage from the incisions.  He has been ambulating with mild pain.  He has some limitations to knee flexion due to effusion and stiffness in the anterior aspect of his knee.  He is weaned off his narcotic pain medication.  Overall he feels his knee is improving.    Objective:   PE:  LLE: Arthroscopic incisions over the anterior aspect of the knee healing appropriately.  No signs of erythema or drainage.  Grade 1 knee effusion.  Mild soft tissue edema.  Passive range of motion full extension to 100 degrees of knee flexion.  Knee flexion limited by tightness and effusion in the anterior aspect of the knee.  Soft endpoint.  Active range of motion is equivalent of passive range of motion.  5/5 knee flexion and extension.  Stable to varus and valgus stress at 0 and 30 degrees any flexion.  2A Lachman.  5/5 ankle plantar flex/dorsiflexion and EHL/FHL.  2+ dorsalis pedis.  Sensation tact in superficial peroneal, deep peroneal, and tibial nerve distribution the foot    A/P:  Patient is a 63-year-old male seen here today with his wife 2 weeks status post a left knee arthroscopic partial medial meniscectomy, ACL debridement, and notchplasty.  Overall he is doing well.  Incision sites healing appropriately.  Edema and effusion improving appropriately.  He will continue physical therapy with unrestricted active and passive range of motion.  He is full weightbearing on his left lower extremity.  He can continue taking ibuprofen and Tylenol as needed for the pain.  He is going to follow-up with me in 4 weeks.    Drake Hazel,  MD    [facility]   Department of Orthopedic Surgery      Disclaimer: This note consists of symbols derived from keyboarding, dictation and/or voice recognition software. As a result, there may be errors in the script that have gone undetected. Please consider this when interpreting information found in this chart.

## 2024-05-06 ENCOUNTER — OFFICE VISIT (OUTPATIENT)
Dept: ORTHOPEDICS | Facility: CLINIC | Age: 64
End: 2024-05-06
Payer: COMMERCIAL

## 2024-05-06 VITALS — BODY MASS INDEX: 26.49 KG/M2 | HEIGHT: 65 IN | WEIGHT: 159 LBS

## 2024-05-06 DIAGNOSIS — Z98.890 S/P LEFT KNEE ARTHROSCOPY: Primary | ICD-10-CM

## 2024-05-06 PROCEDURE — 99024 POSTOP FOLLOW-UP VISIT: CPT | Performed by: STUDENT IN AN ORGANIZED HEALTH CARE EDUCATION/TRAINING PROGRAM

## 2024-05-07 ENCOUNTER — THERAPY VISIT (OUTPATIENT)
Dept: PHYSICAL THERAPY | Facility: CLINIC | Age: 64
End: 2024-05-07
Payer: COMMERCIAL

## 2024-05-07 DIAGNOSIS — M25.562 ACUTE PAIN OF LEFT KNEE: ICD-10-CM

## 2024-05-07 DIAGNOSIS — Z98.890 S/P ARTHROSCOPY OF LEFT KNEE: ICD-10-CM

## 2024-05-07 DIAGNOSIS — S83.242D OTHER TEAR OF MEDIAL MENISCUS OF LEFT KNEE AS CURRENT INJURY, SUBSEQUENT ENCOUNTER: Primary | ICD-10-CM

## 2024-05-07 PROCEDURE — 97112 NEUROMUSCULAR REEDUCATION: CPT | Mod: GP | Performed by: PHYSICAL THERAPIST

## 2024-05-07 PROCEDURE — 97110 THERAPEUTIC EXERCISES: CPT | Mod: GP | Performed by: PHYSICAL THERAPIST

## 2024-05-17 ENCOUNTER — THERAPY VISIT (OUTPATIENT)
Dept: PHYSICAL THERAPY | Facility: CLINIC | Age: 64
End: 2024-05-17
Payer: COMMERCIAL

## 2024-05-17 DIAGNOSIS — Z98.890 S/P ARTHROSCOPY OF LEFT KNEE: ICD-10-CM

## 2024-05-17 DIAGNOSIS — M25.562 ACUTE PAIN OF LEFT KNEE: ICD-10-CM

## 2024-05-17 DIAGNOSIS — S83.242D OTHER TEAR OF MEDIAL MENISCUS OF LEFT KNEE AS CURRENT INJURY, SUBSEQUENT ENCOUNTER: Primary | ICD-10-CM

## 2024-05-17 PROCEDURE — 97110 THERAPEUTIC EXERCISES: CPT | Mod: GP | Performed by: PHYSICAL THERAPIST

## 2024-05-21 ENCOUNTER — THERAPY VISIT (OUTPATIENT)
Dept: PHYSICAL THERAPY | Facility: CLINIC | Age: 64
End: 2024-05-21
Payer: COMMERCIAL

## 2024-05-21 DIAGNOSIS — M25.562 ACUTE PAIN OF LEFT KNEE: ICD-10-CM

## 2024-05-21 DIAGNOSIS — S83.242D OTHER TEAR OF MEDIAL MENISCUS OF LEFT KNEE AS CURRENT INJURY, SUBSEQUENT ENCOUNTER: Primary | ICD-10-CM

## 2024-05-21 DIAGNOSIS — Z98.890 S/P ARTHROSCOPY OF LEFT KNEE: ICD-10-CM

## 2024-05-21 PROCEDURE — 97110 THERAPEUTIC EXERCISES: CPT | Mod: GP | Performed by: PHYSICAL THERAPIST

## 2024-05-28 ENCOUNTER — THERAPY VISIT (OUTPATIENT)
Dept: PHYSICAL THERAPY | Facility: CLINIC | Age: 64
End: 2024-05-28
Payer: COMMERCIAL

## 2024-05-28 DIAGNOSIS — Z98.890 S/P ARTHROSCOPY OF LEFT KNEE: ICD-10-CM

## 2024-05-28 DIAGNOSIS — S83.242D OTHER TEAR OF MEDIAL MENISCUS OF LEFT KNEE AS CURRENT INJURY, SUBSEQUENT ENCOUNTER: Primary | ICD-10-CM

## 2024-05-28 DIAGNOSIS — M25.562 ACUTE PAIN OF LEFT KNEE: ICD-10-CM

## 2024-05-28 PROCEDURE — 97110 THERAPEUTIC EXERCISES: CPT | Mod: GP | Performed by: PHYSICAL THERAPIST

## 2024-05-28 PROCEDURE — 97112 NEUROMUSCULAR REEDUCATION: CPT | Mod: GP | Performed by: PHYSICAL THERAPIST

## 2024-06-04 ENCOUNTER — THERAPY VISIT (OUTPATIENT)
Dept: PHYSICAL THERAPY | Facility: CLINIC | Age: 64
End: 2024-06-04
Payer: COMMERCIAL

## 2024-06-04 DIAGNOSIS — Z98.890 S/P ARTHROSCOPY OF LEFT KNEE: ICD-10-CM

## 2024-06-04 DIAGNOSIS — S83.242D OTHER TEAR OF MEDIAL MENISCUS OF LEFT KNEE AS CURRENT INJURY, SUBSEQUENT ENCOUNTER: Primary | ICD-10-CM

## 2024-06-04 DIAGNOSIS — M25.562 ACUTE PAIN OF LEFT KNEE: ICD-10-CM

## 2024-06-04 DIAGNOSIS — J30.89 ENVIRONMENTAL AND SEASONAL ALLERGIES: ICD-10-CM

## 2024-06-04 PROCEDURE — 97110 THERAPEUTIC EXERCISES: CPT | Mod: GP | Performed by: PHYSICAL THERAPIST

## 2024-06-04 RX ORDER — FLUTICASONE PROPIONATE 50 MCG
SPRAY, SUSPENSION (ML) NASAL
Qty: 16 G | Refills: 3 | Status: SHIPPED | OUTPATIENT
Start: 2024-06-04 | End: 2024-09-23

## 2024-06-04 NOTE — PROGRESS NOTES
06/04/24 0500   Appointment Info   Signing clinician's name / credentials Caryl Melton PT   Total/Authorized Visits E&T 10   Visits Used 6   Medical Diagnosis s/p arthroscopy of left knee; partial medial meniscectomy and ACL debridement   PT Tx Diagnosis acute pain of left knee; aftercare s/p left knee arthroscopy   Other pertinent information follow [facility] protocol for knee arthroscopy meniscectomy   Quick Adds Certification   Progress Note/Certification   Onset of illness/injury or Date of Surgery 04/23/24   Therapy Frequency 1/week x 8 weeks, then every 2 weeks x 4 weeks   Predicted Duration 12 weeks   Certification date from 04/30/24   Certification date to 07/23/24   Progress Note Due Date 07/23/24   Progress Note Completed Date 04/30/24   GOALS   PT Goals 2   PT Goal 1   Goal Identifier Ambulation   Goal Description Minutes patient will be able to  walk; on uneven terrain; on sharp inclines/declines; Able to make sharp turns; Ambulate without gait deviation 30 min   Rationale to maximize safety and independence with performance of ADLs and functional tasks;to maximize safety and independence within the home;to maximize safety and independence within the community;to maximize safety and independence with transportation;to maximize safety and independence with self cares   Goal Progress improving, amb without assitive device with good gait pattern.   Target Date 07/23/24   PT Goal 2   Goal Identifier Stairs   Goal Description Ascend/descend stairs; in a normal reciprocal pattern   Rationale to maximize safety and independence with performance of ADLs and functional tasks;to maximize safety and independence within the home;to maximize safety and independence within the community;to maximize safety and independence with transportation;to maximize safety and independence with self cares   Goal Progress ascending is going well, still some pain with going down stairs.   Target Date 07/23/24   Subjective Report    Subjective Report Reporting some inferior patellar area/ patella tendon insertion region pain with going down stairs. No pain with walking, exercises are going alright, had instructed to hold on step up but pt has continued to do them.   Objective Measures   Objective Measures Objective Measure 1;Objective Measure 2   Objective Measure 1   Objective Measure left knee AROM   Details 0-130 with some mild pain medial knee at end range.   Objective Measure 2   Objective Measure palpation   Details some tenderness inferior patellar region and patellar tendon insertion   Treatment Interventions (PT)   Interventions Therapeutic Procedure/Exercise;Neuromuscular Re-education;Therapeutic Activity   Therapeutic Procedure/Exercise   Therapeutic Procedures: strength, endurance, ROM, flexibility minutes (07742) 30   Therapeutic Procedures Ther Proc 2   Ther Proc 1 bike   Ther Proc 1 - Details SH 6 x 5 minutes for ROM   Ther Proc 2 ice   Ther Proc 2 - Details instructed to ice 1x/day at least as has not been doing anymore and some swelling noted inferior patellar region   PTRx Ther Proc 1 Quad Set Without Towel Roll Under Knee   PTRx Ther Proc 1 - Details x 10   PTRx Ther Proc 2 Supine Heel Slides   PTRx Ther Proc 2 - Details x 10 supine with slide board cueing for hold at end range flexion for stretch   PTRx Ther Proc 3 Passive Knee Extension Stretch   PTRx Ther Proc 3 - Details x 3  20 sec hold   PTRx Ther Proc 4 Hip Flexion Straight Leg Raise   PTRx Ther Proc 4 - Details x 10 x 2    PTRx Ther Proc 5 Bridging #1   PTRx Ther Proc 5 - Details x 10 with 5 sec hold   PTRx Ther Proc 6 Toe Raises   PTRx Ther Proc 6 - Details x 10   PTRx Ther Proc 7 Standing Hip Abduction   PTRx Ther Proc 7 - Details  x 10 right and left   PTRx Ther Proc 8 Sit to Stand   PTRx Ther Proc 8 - Details HELD from raised treatment table x 5 x 2 able to do without inferior patellar pain   Skilled Intervention Verbal cues and demonstration used for proper  performance of exercise including cues for proper form to decrease substitutions and isolate muscle being targeted; cues for slow and controlled motion both concentrically and eccentrically; cueing for good starting position and posture, and to ensure safe performance of motion   Therapeutic Activity   PTRx Ther Act 1 Stair Step Ups   PTRx Ther Act 1 - Details not today  4 inch step x 5 x 2 with improved form   Neuromuscular Re-education   Neuromuscular re-ed of mvmt, balance, coord, kinesthetic sense, posture, proprioception minutes (24277) 3   PTRx Neuro Re-ed 1 Balance Single Leg Stance Supported and Unsupported   PTRx Neuro Re-ed 1 - Details x 30 sec x 2    PTRx Neuro Re-ed 2 Functional Lunge Forward   PTRx Neuro Re-ed 2 - Details held    Education   Learner/Method Patient;Pictures/Video;No Barriers to Learning   Education Comments prefers handouts for HEP   Plan   Home program see PTRX   Updates to plan of care issued updated handouts for HEP; PN completed as has follow up with physician 6/12/2024.continue per current plan of care   Plan for next session continue to progress per protocol   Comments   Comments pt has shown good overal improvement with increased ROM and strength. Having some inferior patellar area pain in past 1-2 weeks. At last visit had instructed not to do step ups as not able to do with good form, but continued to do with HEP and this may have contributed to soreness. will hold on advanced CKC activities until decreased pain and able to do with good form   Total Session Time   Timed Code Treatment Minutes 33   Total Treatment Time (sum of timed and untimed services) 33       PLAN  Continue therapy per current plan of care.    Beginning/End Dates of Progress Note Reporting Period:  04/30/24 to 06/04/2024    Referring Provider:  Drake Hazel

## 2024-06-12 ENCOUNTER — TELEPHONE (OUTPATIENT)
Dept: ORTHOPEDICS | Facility: CLINIC | Age: 64
End: 2024-06-12

## 2024-06-12 NOTE — TELEPHONE ENCOUNTER
Called patient to discuss rescheduling his post-op appointment with Dr Hazel. Originally scheduled 6/12/24. Patient was rescheduled to 6/17/24 at 3:00pm.    CEE, VF 6/12/24 3:38pm

## 2024-06-17 ENCOUNTER — OFFICE VISIT (OUTPATIENT)
Dept: ORTHOPEDICS | Facility: CLINIC | Age: 64
End: 2024-06-17
Payer: COMMERCIAL

## 2024-06-17 VITALS — BODY MASS INDEX: 26.49 KG/M2 | WEIGHT: 159 LBS | HEIGHT: 65 IN

## 2024-06-17 DIAGNOSIS — Z98.890 S/P ARTHROSCOPY OF LEFT KNEE: Primary | ICD-10-CM

## 2024-06-17 PROCEDURE — 99024 POSTOP FOLLOW-UP VISIT: CPT | Performed by: STUDENT IN AN ORGANIZED HEALTH CARE EDUCATION/TRAINING PROGRAM

## 2024-06-17 NOTE — LETTER
6/17/2024      Walt Yin  [address] [place]  [facility] [address]      Dear Colleague,    Thank you for referring your patient, Walt Yin, to the [facility] [place]. Please see a copy of my visit note below.    CC: 8 weeks status post arthroscopic partial medial meniscectomy, ACL debridement, and notchplasty.    HPI: Patient is a 64-year-old male seen here today with his adult wife in follow-up in 8 weeks status post a left knee arthroscopic ACL debridement, partial medial meniscectomy, and notchplasty.  He has been doing physical therapy at our [place] location.  He states that overall his symptoms continue to improve.  He gets occasional pain and crepitus underneath his patella with activity.  He feels this is improving.  He feels his range of motion continues to improve.  He does not have any of the posterior joint pain he had before surgery.  Overall he feels he has less pain than before surgery.  His knee does not limit him from any of his desired daily activities.    Objective:   PE:  LLE: Well-healed arthroscopic incisions about the left knee.  Trace effusion.  Passive range of motion full extension to 130 degrees of flexion.  Active range of motion: To passive range of motion.  5/5 knee flexion extension.  Crepitus and mild pain to patellar grind.  No pain to palpation over the medial or lateral joint space.  No pain to palpation over the posterior aspect of the knee.  2A Lachman.  Stable anterior posterior drawer.  Stable to varus and valgus at 0 and 30 degrees knee flexion    A/P:  Patient is a 64-year-old male seen here today 8 weeks status post left knee arthroscopic partial ACL debridement, partial medial meniscectomy, and notchplasty.  Overall he is doing well.  His symptoms continue to is proved.  His range of motion is already improved from preoperative.  He has returned to all of his desired activities.  He has unrestricted active and passive range of motion  with therapy.  He can work on strengthening and return to activities as his symptoms allow.  Schedule follow-up with me in additional 2 to 3 months in clinic    Drake Hazel MD    [facility]   Department of Orthopedic Surgery      Disclaimer: This note consists of symbols derived from keyboarding, dictation and/or voice recognition software. As a result, there may be errors in the script that have gone undetected. Please consider this when interpreting information found in this chart.         Again, thank you for allowing me to participate in the care of your patient.        Sincerely,        Drake Hazel MD

## 2024-06-17 NOTE — PROGRESS NOTES
CC: 8 weeks status post arthroscopic partial medial meniscectomy, ACL debridement, and notchplasty.    HPI: Patient is a 64-year-old male seen here today with his adult wife in follow-up in 8 weeks status post a left knee arthroscopic ACL debridement, partial medial meniscectomy, and notchplasty.  He has been doing physical therapy at our [place] location.  He states that overall his symptoms continue to improve.  He gets occasional pain and crepitus underneath his patella with activity.  He feels this is improving.  He feels his range of motion continues to improve.  He does not have any of the posterior joint pain he had before surgery.  Overall he feels he has less pain than before surgery.  His knee does not limit him from any of his desired daily activities.    Objective:   PE:  LLE: Well-healed arthroscopic incisions about the left knee.  Trace effusion.  Passive range of motion full extension to 130 degrees of flexion.  Active range of motion: To passive range of motion.  5/5 knee flexion extension.  Crepitus and mild pain to patellar grind.  No pain to palpation over the medial or lateral joint space.  No pain to palpation over the posterior aspect of the knee.  2A Lachman.  Stable anterior posterior drawer.  Stable to varus and valgus at 0 and 30 degrees knee flexion    A/P:  Patient is a 64-year-old male seen here today 8 weeks status post left knee arthroscopic partial ACL debridement, partial medial meniscectomy, and notchplasty.  Overall he is doing well.  His symptoms continue to is proved.  His range of motion is already improved from preoperative.  He has returned to all of his desired activities.  He has unrestricted active and passive range of motion with therapy.  He can work on strengthening and return to activities as his symptoms allow.  Schedule follow-up with me in additional 2 to 3 months in clinic    Drake Hazel MD    [facility]   Department of Orthopedic  Surgery      Disclaimer: This note consists of symbols derived from keyboarding, dictation and/or voice recognition software. As a result, there may be errors in the script that have gone undetected. Please consider this when interpreting information found in this chart.

## 2024-07-02 PROBLEM — S83.242D OTHER TEAR OF MEDIAL MENISCUS OF LEFT KNEE AS CURRENT INJURY, SUBSEQUENT ENCOUNTER: Status: RESOLVED | Noted: 2024-04-30 | Resolved: 2024-07-02

## 2024-07-02 PROBLEM — M25.562 ACUTE PAIN OF LEFT KNEE: Status: RESOLVED | Noted: 2024-04-30 | Resolved: 2024-07-02

## 2024-07-02 PROBLEM — Z98.890 S/P ARTHROSCOPY OF LEFT KNEE: Status: RESOLVED | Noted: 2024-04-30 | Resolved: 2024-07-02

## 2024-07-02 NOTE — PROGRESS NOTES
06/04/24 0500   Appointment Info   Signing clinician's name / credentials Caryl Melton PT   Total/Authorized Visits E&T 10   Visits Used 6   Medical Diagnosis s/p arthroscopy of left knee; partial medial meniscectomy and ACL debridement   PT Tx Diagnosis acute pain of left knee; aftercare s/p left knee arthroscopy   Other pertinent information follow [facility] protocol for knee arthroscopy meniscectomy   Quick Adds Certification   Progress Note/Certification   Onset of illness/injury or Date of Surgery 04/23/24   Therapy Frequency 1/week x 8 weeks, then every 2 weeks x 4 weeks   Predicted Duration 12 weeks   Certification date from 04/30/24   Certification date to 07/23/24   Progress Note Due Date 07/23/24   Progress Note Completed Date 04/30/24   GOALS   PT Goals 2   PT Goal 1   Goal Identifier Ambulation   Goal Description Minutes patient will be able to  walk; on uneven terrain; on sharp inclines/declines; Able to make sharp turns; Ambulate without gait deviation 30 min   Rationale to maximize safety and independence with performance of ADLs and functional tasks;to maximize safety and independence within the home;to maximize safety and independence within the community;to maximize safety and independence with transportation;to maximize safety and independence with self cares   Goal Progress improving, amb without assitive device with good gait pattern.   Target Date 07/23/24   PT Goal 2   Goal Identifier Stairs   Goal Description Ascend/descend stairs; in a normal reciprocal pattern   Rationale to maximize safety and independence with performance of ADLs and functional tasks;to maximize safety and independence within the home;to maximize safety and independence within the community;to maximize safety and independence with transportation;to maximize safety and independence with self cares   Goal Progress ascending is going well, still some pain with going down stairs.   Target Date 07/23/24   Subjective Report    Subjective Report Reporting some inferior patellar area/ patella tendon insertion region pain with going down stairs. No pain with walking, exercises are going alright, had instructed to hold on step up but pt has continued to do them.   Objective Measures   Objective Measures Objective Measure 1;Objective Measure 2   Objective Measure 1   Objective Measure left knee AROM   Details 0-130 with some mild pain medial knee at end range.   Objective Measure 2   Objective Measure palpation   Details some tenderness inferior patellar region and patellar tendon insertion   Treatment Interventions (PT)   Interventions Therapeutic Procedure/Exercise;Neuromuscular Re-education;Therapeutic Activity   Therapeutic Procedure/Exercise   Therapeutic Procedures: strength, endurance, ROM, flexibility minutes (95908) 30   Therapeutic Procedures Ther Proc 2   Ther Proc 1 bike   Ther Proc 1 - Details SH 6 x 5 minutes for ROM   Ther Proc 2 ice   Ther Proc 2 - Details instructed to ice 1x/day at least as has not been doing anymore and some swelling noted inferior patellar region   PTRx Ther Proc 1 Quad Set Without Towel Roll Under Knee   PTRx Ther Proc 1 - Details x 10   PTRx Ther Proc 2 Supine Heel Slides   PTRx Ther Proc 2 - Details x 10 supine with slide board cueing for hold at end range flexion for stretch   PTRx Ther Proc 3 Passive Knee Extension Stretch   PTRx Ther Proc 3 - Details x 3  20 sec hold   PTRx Ther Proc 4 Hip Flexion Straight Leg Raise   PTRx Ther Proc 4 - Details x 10 x 2    PTRx Ther Proc 5 Bridging #1   PTRx Ther Proc 5 - Details x 10 with 5 sec hold   PTRx Ther Proc 6 Toe Raises   PTRx Ther Proc 6 - Details x 10   PTRx Ther Proc 7 Standing Hip Abduction   PTRx Ther Proc 7 - Details  x 10 right and left   PTRx Ther Proc 8 Sit to Stand   PTRx Ther Proc 8 - Details HELD from raised treatment table x 5 x 2 able to do without inferior patellar pain   Skilled Intervention Verbal cues and demonstration used for proper  performance of exercise including cues for proper form to decrease substitutions and isolate muscle being targeted; cues for slow and controlled motion both concentrically and eccentrically; cueing for good starting position and posture, and to ensure safe performance of motion   Therapeutic Activity   PTRx Ther Act 1 Stair Step Ups   PTRx Ther Act 1 - Details not today  4 inch step x 5 x 2 with improved form   Neuromuscular Re-education   Neuromuscular re-ed of mvmt, balance, coord, kinesthetic sense, posture, proprioception minutes (73813) 3   PTRx Neuro Re-ed 1 Balance Single Leg Stance Supported and Unsupported   PTRx Neuro Re-ed 1 - Details x 30 sec x 2    PTRx Neuro Re-ed 2 Functional Lunge Forward   PTRx Neuro Re-ed 2 - Details held    Education   Learner/Method Patient;Pictures/Video;No Barriers to Learning   Education Comments prefers handouts for HEP   Plan   Home program see PTRX   Updates to plan of care issued updated handouts for HEP; PN completed as has follow up with physician 6/12/2024.continue per current plan of care   Plan for next session continue to progress per protocol   Comments   Comments pt has shown good overal improvement with increased ROM and strength. Having some inferior patellar area pain in past 1-2 weeks. At last visit had instructed not to do step ups as not able to do with good form, but continued to do with HEP and this may have contributed to soreness. will hold on advanced CKC activities until decreased pain and able to do with good form   Total Session Time   Timed Code Treatment Minutes 33   Total Treatment Time (sum of timed and untimed services) 33       DISCHARGE  Reason for Discharge: Patient has failed to schedule further appointments.  Pt cancelled remaining visits, no further therapy scheduled, status as noted above on last visit.    Equipment Issued: none    Discharge Plan: Patient to continue home program.    Referring Provider:  Drake Hazel

## 2024-07-14 ENCOUNTER — HEALTH MAINTENANCE LETTER (OUTPATIENT)
Age: 64
End: 2024-07-14

## 2024-07-16 NOTE — LETTER
5/6/2024         RE: Walt Yin  [address] [place]  [facility] [address]        Dear Colleague,    Thank you for referring your patient, Walt Yin, to the [facility] [place]. Please see a copy of my visit note below.    CC: 2-week status post left knee arthroscopic partial medial meniscectomy and ACL debridement    HPI: Patient is a 63-year-old male seen here today with his wife 2 weeks status post a left knee arthroscopic partial medial meniscectomy, ACL debridement, and notchplasty.  He has been doing physical therapy at our [facility] location.  He still notes some effusion of the left knee.  He denies any erythema or drainage from the incisions.  He has been ambulating with mild pain.  He has some limitations to knee flexion due to effusion and stiffness in the anterior aspect of his knee.  He is weaned off his narcotic pain medication.  Overall he feels his knee is improving.    Objective:   PE:  LLE: Arthroscopic incisions over the anterior aspect of the knee healing appropriately.  No signs of erythema or drainage.  Grade 1 knee effusion.  Mild soft tissue edema.  Passive range of motion full extension to 100 degrees of knee flexion.  Knee flexion limited by tightness and effusion in the anterior aspect of the knee.  Soft endpoint.  Active range of motion is equivalent of passive range of motion.  5/5 knee flexion and extension.  Stable to varus and valgus stress at 0 and 30 degrees any flexion.  2A Lachman.  5/5 ankle plantar flex/dorsiflexion and EHL/FHL.  2+ dorsalis pedis.  Sensation tact in superficial peroneal, deep peroneal, and tibial nerve distribution the foot    A/P:  Patient is a 63-year-old male seen here today with his wife 2 weeks status post a left knee arthroscopic partial medial meniscectomy, ACL debridement, and notchplasty.  Overall he is doing well.  Incision sites healing appropriately.  Edema and effusion improving appropriately.  He will continue  physical therapy with unrestricted active and passive range of motion.  He is full weightbearing on his left lower extremity.  He can continue taking ibuprofen and Tylenol as needed for the pain.  He is going to follow-up with me in 4 weeks.    Drake Hazel MD    [facility]   Department of Orthopedic Surgery      Disclaimer: This note consists of symbols derived from keyboarding, dictation and/or voice recognition software. As a result, there may be errors in the script that have gone undetected. Please consider this when interpreting information found in this chart.         Again, thank you for allowing me to participate in the care of your patient.        Sincerely,        Drake Hazel MD   Never smoker

## 2024-09-10 ENCOUNTER — DOCUMENTATION ONLY (OUTPATIENT)
Dept: SLEEP MEDICINE | Facility: CLINIC | Age: 64
End: 2024-09-10
Payer: COMMERCIAL

## 2024-09-10 DIAGNOSIS — G47.33 OSA (OBSTRUCTIVE SLEEP APNEA): Primary | ICD-10-CM

## 2024-09-23 ENCOUNTER — OFFICE VISIT (OUTPATIENT)
Dept: PEDIATRICS | Facility: CLINIC | Age: 64
End: 2024-09-23
Payer: COMMERCIAL

## 2024-09-23 ENCOUNTER — OFFICE VISIT (OUTPATIENT)
Dept: ORTHOPEDICS | Facility: CLINIC | Age: 64
End: 2024-09-23
Payer: COMMERCIAL

## 2024-09-23 VITALS
HEIGHT: 64 IN | DIASTOLIC BLOOD PRESSURE: 70 MMHG | BODY MASS INDEX: 25.03 KG/M2 | WEIGHT: 146.6 LBS | TEMPERATURE: 97.7 F | OXYGEN SATURATION: 98 % | RESPIRATION RATE: 20 BRPM | HEART RATE: 66 BPM | SYSTOLIC BLOOD PRESSURE: 116 MMHG

## 2024-09-23 DIAGNOSIS — Z98.890 S/P ARTHROSCOPY OF LEFT KNEE: Primary | ICD-10-CM

## 2024-09-23 DIAGNOSIS — E78.2 MIXED HYPERLIPIDEMIA: ICD-10-CM

## 2024-09-23 DIAGNOSIS — Z72.0 TOBACCO CHEW USE: ICD-10-CM

## 2024-09-23 DIAGNOSIS — E11.65 TYPE 2 DIABETES MELLITUS WITH HYPERGLYCEMIA, WITHOUT LONG-TERM CURRENT USE OF INSULIN (H): ICD-10-CM

## 2024-09-23 DIAGNOSIS — Z00.00 ROUTINE GENERAL MEDICAL EXAMINATION AT A HEALTH CARE FACILITY: Primary | ICD-10-CM

## 2024-09-23 DIAGNOSIS — H10.13 ALLERGIC CONJUNCTIVITIS, BILATERAL: ICD-10-CM

## 2024-09-23 DIAGNOSIS — J30.89 ENVIRONMENTAL AND SEASONAL ALLERGIES: ICD-10-CM

## 2024-09-23 DIAGNOSIS — G47.33 OSA (OBSTRUCTIVE SLEEP APNEA): ICD-10-CM

## 2024-09-23 LAB
ALBUMIN SERPL BCG-MCNC: 4.5 G/DL (ref 3.5–5.2)
ALP SERPL-CCNC: 123 U/L (ref 40–150)
ALT SERPL W P-5'-P-CCNC: 21 U/L (ref 0–70)
ANION GAP SERPL CALCULATED.3IONS-SCNC: 11 MMOL/L (ref 7–15)
AST SERPL W P-5'-P-CCNC: 27 U/L (ref 0–45)
BILIRUB SERPL-MCNC: 0.8 MG/DL
BUN SERPL-MCNC: 7 MG/DL (ref 8–23)
CALCIUM SERPL-MCNC: 9.6 MG/DL (ref 8.8–10.4)
CHLORIDE SERPL-SCNC: 101 MMOL/L (ref 98–107)
CHOLEST SERPL-MCNC: 140 MG/DL
CREAT SERPL-MCNC: 0.78 MG/DL (ref 0.67–1.17)
CREAT UR-MCNC: 50.1 MG/DL
EGFRCR SERPLBLD CKD-EPI 2021: >90 ML/MIN/1.73M2
EST. AVERAGE GLUCOSE BLD GHB EST-MCNC: 143 MG/DL
FASTING STATUS PATIENT QL REPORTED: YES
FASTING STATUS PATIENT QL REPORTED: YES
GLUCOSE SERPL-MCNC: 132 MG/DL (ref 70–99)
HBA1C MFR BLD: 6.6 % (ref 0–5.6)
HCO3 SERPL-SCNC: 26 MMOL/L (ref 22–29)
HDLC SERPL-MCNC: 56 MG/DL
LDLC SERPL CALC-MCNC: 73 MG/DL
MICROALBUMIN UR-MCNC: <12 MG/L
MICROALBUMIN/CREAT UR: NORMAL MG/G{CREAT}
NONHDLC SERPL-MCNC: 84 MG/DL
POTASSIUM SERPL-SCNC: 5.4 MMOL/L (ref 3.4–5.3)
PROT SERPL-MCNC: 7.5 G/DL (ref 6.4–8.3)
SODIUM SERPL-SCNC: 138 MMOL/L (ref 135–145)
TRIGL SERPL-MCNC: 57 MG/DL

## 2024-09-23 PROCEDURE — 99213 OFFICE O/P EST LOW 20 MIN: CPT | Performed by: STUDENT IN AN ORGANIZED HEALTH CARE EDUCATION/TRAINING PROGRAM

## 2024-09-23 PROCEDURE — 82043 UR ALBUMIN QUANTITATIVE: CPT | Performed by: INTERNAL MEDICINE

## 2024-09-23 PROCEDURE — 99396 PREV VISIT EST AGE 40-64: CPT | Performed by: INTERNAL MEDICINE

## 2024-09-23 PROCEDURE — 80053 COMPREHEN METABOLIC PANEL: CPT | Performed by: INTERNAL MEDICINE

## 2024-09-23 PROCEDURE — 82570 ASSAY OF URINE CREATININE: CPT | Performed by: INTERNAL MEDICINE

## 2024-09-23 PROCEDURE — 83036 HEMOGLOBIN GLYCOSYLATED A1C: CPT | Performed by: INTERNAL MEDICINE

## 2024-09-23 PROCEDURE — 99207 PR FOOT EXAM NO CHARGE: CPT | Performed by: INTERNAL MEDICINE

## 2024-09-23 PROCEDURE — 80061 LIPID PANEL: CPT | Performed by: INTERNAL MEDICINE

## 2024-09-23 PROCEDURE — 36415 COLL VENOUS BLD VENIPUNCTURE: CPT | Performed by: INTERNAL MEDICINE

## 2024-09-23 PROCEDURE — 99214 OFFICE O/P EST MOD 30 MIN: CPT | Mod: 25 | Performed by: INTERNAL MEDICINE

## 2024-09-23 RX ORDER — OLOPATADINE HYDROCHLORIDE 1 MG/ML
1 SOLUTION/ DROPS OPHTHALMIC 2 TIMES DAILY
Qty: 5 ML | Refills: 11 | Status: SHIPPED | OUTPATIENT
Start: 2024-09-23

## 2024-09-23 RX ORDER — SIMVASTATIN 40 MG
TABLET ORAL
Qty: 90 TABLET | Refills: 11 | Status: SHIPPED | OUTPATIENT
Start: 2024-09-23

## 2024-09-23 RX ORDER — DICLOFENAC SODIUM 75 MG/1
75 TABLET, DELAYED RELEASE ORAL 2 TIMES DAILY
Qty: 20 TABLET | Refills: 0 | Status: SHIPPED | OUTPATIENT
Start: 2024-09-23

## 2024-09-23 RX ORDER — FLUTICASONE PROPIONATE 50 MCG
SPRAY, SUSPENSION (ML) NASAL
Qty: 16 G | Refills: 11 | Status: SHIPPED | OUTPATIENT
Start: 2024-09-23

## 2024-09-23 ASSESSMENT — SOCIAL DETERMINANTS OF HEALTH (SDOH): HOW OFTEN DO YOU GET TOGETHER WITH FRIENDS OR RELATIVES?: ONCE A WEEK

## 2024-09-23 ASSESSMENT — PAIN SCALES - GENERAL: PAINLEVEL: NO PAIN (0)

## 2024-09-23 NOTE — LETTER
9/23/2024      Walt Yin  [address] [place] [address]      Dear Colleague,    Thank you for referring your patient, Walt Yin, to the [facility] [place]. Please see a copy of my visit note below.    CC: 5-month status post left knee ACL debridement and partial medial meniscectomy    HPI: Patient is a 64-year-old male known to my practice with a history of left knee medial meniscus tear and mucoid ACL who is now 5-month status post a left knee arthroscopic partial medial meniscectomy and ACL debridement.  He states that overall his symptoms are improved from before surgery.  He still gets occasional pain in the anterior aspect of his knee deep to the patellar tendon as well as fullness and pain in the posterior aspect of his knee.  He has completed his physical therapy.  He is not currently taking any oral medication.  His knee is not stopping him from performing any of his desired daily activities.    Objective:   PE:  LLE: Healed surgical incisions about his left knee.  0 to 150 degrees of knee flexion.  Active range of motion is equivalent to passive range of motion.  5/5 knee flexion and extension.  Trace effusion.  No pain to palpation over the medial or lateral joint line.  Mild pain to palpation over the patella.  Mild fullness and pain to palpation over the posterior joint line.  Stable to varus and valgus stress at 0 and 30 days knee flexion.  Stable anterior and posterior drawer    A/P:  Patient is a 64-year-old male seen here today in follow-up 5-month status post left knee arthroscopic partial medial meniscectomy and ACL debridement.  Overall he is improved his pain from before surgery.  He has regained his range of motion.  He has returned to all of his desired activities.  He states he continues to have a little pain in the anterior and posterior aspect of his knee.  He does have a trace effusion today.  I discussed them treatment options.  I offered  him an intra-articular corticosteroid injection to help with pain and inflammation.  He declined at this time.  Discussed with him that we could try a short course of p.o. Voltaren to help with the inflammation.  He is very interested in this.  I will prescribe him 75 mg p.o. Voltaren twice daily for 10 days to help with inflammation.  I discussed with him that at this time would be important to continue to work on increasing his activity and quad strength.  He is in agreement with this plan.    Drake Hazel MD    [facility]   Department of Orthopedic Surgery      Disclaimer: This note consists of symbols derived from keyboarding, dictation and/or voice recognition software. As a result, there may be errors in the script that have gone undetected. Please consider this when interpreting information found in this chart.         Again, thank you for allowing me to participate in the care of your patient.        Sincerely,        Drake Hazel MD

## 2024-09-23 NOTE — PROGRESS NOTES
"Preventive Care Visit  [facility] PRADEEP Ge MD, Internal Medicine - Pediatrics  Sep 23, 2024      Assessment & Plan     (Z00.00) Routine general medical examination at a health care facility  (primary encounter diagnosis)    (E11.65) Type 2 diabetes mellitus with hyperglycemia, without long-term current use of insulin (H)  Comment:   diet controlled diabetes, due for labwork.    Lab Results   Component Value Date    A1C 6.6 09/23/2024    A1C 6.0 04/21/2021    Plan: Albumin Random Urine Quantitative with Creat         Ratio, OPTOMETRY REFERRAL, HEMOGLOBIN A1C,         Lipid panel reflex to direct LDL Non-fasting,         Comprehensive metabolic panel (BMP + Alb, Alk         Phos, ALT, AST, Total. Bili, TP), FOOT EXAM          (E78.2) Mixed hyperlipidemia  Comment:   Lab Results   Component Value Date    LDL 67 09/20/2023    LDL 66 04/21/2021    Plan: simvastatin (ZOCOR) 40 MG tablet        Tolerating statin/Adequate control.  Continue simvastatin    (Z72.0) Tobacco chew use  Comment: encouraged to quit.  Start nicorette as needed/Medication side effects discussed.   Plan: nicotine polacrilex (NICORETTE) 4 MG gum          (G47.33) ZENIA (obstructive sleep apnea)  Comment:   Plan: continue cpap    (H10.13) Allergic conjunctivitis, bilateral  Comment:   Plan: olopatadine (PATANOL) 0.1 % ophthalmic solution          (J30.89) Environmental and seasonal allergies  Comment:   Plan: fluticasone (FLONASE) 50 MCG/ACT nasal spray                    BMI  Estimated body mass index is 25.06 kg/m  as calculated from the following:    Height as of this encounter: 1.629 m (5' 4.13\").    Weight as of this encounter: 66.5 kg (146 lb 9.6 oz).       Counseling  Appropriate preventive services were addressed with this patient via screening, questionnaire, or discussion as appropriate for fall prevention, nutrition, physical activity, Tobacco-use cessation, social engagement, weight loss and cognition.  Checklist " reviewing preventive services available has been given to the patient.  Reviewed patient's diet, addressing concerns and/or questions.           Tyrel Godoy is a 64 year old, presenting for the following:  Physical        9/23/2024     9:22 AM   Additional Questions   Roomed by Adeline NICHOLAS   Accompanied by N/A         9/23/2024     9:22 AM   Patient Reported Additional Medications   Patient reports taking the following new medications No        Health Care Directive  Patient does not have a Health Care Directive or Living Will: Discussed advance care planning with patient; information given to patient to review.    HPI              9/23/2024   General Health   How would you rate your overall physical health? Good   Feel stress (tense, anxious, or unable to sleep) Not at all            9/23/2024   Nutrition   Three or more servings of calcium each day? (!) NO   Diet: Regular (no restrictions)   How many servings of fruit and vegetables per day? (!) 2-3   How many sweetened beverages each day? 0-1            9/23/2024   Exercise   Days per week of moderate/strenous exercise 7 days   Average minutes spent exercising at this level 50 min            9/23/2024   Social Factors   Frequency of gathering with friends or relatives Once a week   Worry food won't last until get money to buy more No   Food not last or not have enough money for food? No   Do you have housing? (Housing is defined as stable permanent housing and does not include staying ouside in a car, in a tent, in an abandoned building, in an overnight shelter, or couch-surfing.) Yes   Are you worried about losing your housing? No   Lack of transportation? No   Unable to get utilities (heat,electricity)? No            9/23/2024   Fall Risk   Fallen 2 or more times in the past year? No   Trouble with walking or balance? No             9/23/2024   Dental   Dentist two times every year? Yes            9/23/2024   TB Screening   Were you born outside of the  [place]? Yes              Today's PHQ-2 Score:       3/7/2024     2:30 PM   PHQ-2 ( 1999 [employer])   Q1: Little interest or pleasure in doing things 0   Q2: Feeling down, depressed or hopeless 0   PHQ-2 Score 0         9/23/2024   Substance Use   Alcohol more than 3/day or more than 7/wk No   Do you use any other substances recreationally? No        Social History     Tobacco Use    Smoking status: Never    Smokeless tobacco: Current   Vaping Use    Vaping status: Never Used   Substance Use Topics    Alcohol use: Yes     Comment: Social    Drug use: No           9/23/2024   STI Screening   New sexual partner(s) since last STI/HIV test? No      Last PSA:   PSA   Date Value Ref Range Status   03/14/2020 0.40 0 - 4 ug/L Final     Comment:     Assay Method:  Chemiluminescence using Siemens Vista analyzer     Prostate Specific Antigen Screen   Date Value Ref Range Status   09/20/2023 0.36 0.00 - 4.50 ng/mL Final   06/08/2022 0.53 0.00 - 4.00 ug/L Final     ASCVD Risk   The 10-year ASCVD risk score (Monet ESTRADA, et al., 2019) is: 9.8%    Values used to calculate the score:      Age: 64 years      Sex: Male      Is Non- : No      Diabetic: Yes      Tobacco smoker: No      Systolic Blood Pressure: 116 mmHg      Is BP treated: No      HDL Cholesterol: 77 mg/dL      Total Cholesterol: 155 mg/dL           Reviewed and updated as needed this visit by Provider                    Patient Active Problem List   Diagnosis    ZENIA (obstructive sleep apnea)    Type 2 diabetes mellitus with hyperglycemia (H)    Mixed hyperlipidemia    Environmental and seasonal allergies    Allergic conjunctivitis, bilateral    Tobacco chew use     No past surgical history on file.    Social History     Tobacco Use    Smoking status: Never    Smokeless tobacco: Current   Substance Use Topics    Alcohol use: Yes     Comment: Social     No family history on file.      Current Outpatient Medications   Medication Sig Dispense Refill  "   acetaminophen (TYLENOL) 500 MG tablet Take 1-2 tablets (500-1,000 mg) by mouth every 6 hours as needed for mild pain 60 tablet 0    aspirin (ASA) 81 MG tablet Take 81 mg by mouth      cetirizine (ZYRTEC) 10 MG tablet Take 1 tablet (10 mg) by mouth daily as needed for allergies 90 tablet 11    fish oil-omega-3 fatty acids 500 MG capsule Take by mouth daily      fluticasone (FLONASE) 50 MCG/ACT nasal spray USE 2 SPRAYS INTO EACH NOSTRIL DAILY 16 g 11    nicotine polacrilex (NICORETTE) 4 MG gum Place 1 each (4 mg) inside cheek every hour as needed for nicotine withdrawal symptoms. 100 each 11    olopatadine (PATANOL) 0.1 % ophthalmic solution Place 1 drop into both eyes 2 times daily. 5 mL 11    ondansetron (ZOFRAN ODT) 4 MG ODT tab Take 1 tablet (4 mg) by mouth every 8 hours as needed for nausea 5 tablet 0    simvastatin (ZOCOR) 40 MG tablet TAKE ONE TABLET BY MOUTH EVERY NIGHT AT BEDTIME 90 tablet 11         Review of Systems  Constitutional, neuro, ENT, endocrine, pulmonary, cardiac, gastrointestinal, genitourinary, musculoskeletal, integument and psychiatric systems are negative, except as otherwise noted.     Objective    Exam  /70 (BP Location: Right arm, Patient Position: Sitting, Cuff Size: Adult Regular)   Pulse 66   Temp 97.7  F (36.5  C) (Temporal)   Resp 20   Ht 1.629 m (5' 4.13\")   Wt 66.5 kg (146 lb 9.6 oz)   SpO2 98%   BMI 25.06 kg/m     Estimated body mass index is 25.06 kg/m  as calculated from the following:    Height as of this encounter: 1.629 m (5' 4.13\").    Weight as of this encounter: 66.5 kg (146 lb 9.6 oz).    Physical Exam  GENERAL: alert and no distress  EYES: Eyes grossly normal to inspection, PERRL and conjunctivae and sclerae normal  HENT: ear canals and TM's normal, OP: poor dentition/ tobacco staining-mucosa otherwise appears normal without leukoplakia  NECK: no adenopathy, no asymmetry, masses, or scars  RESP: lungs clear to auscultation - no rales, rhonchi or " wheezes  CV: regular rate and rhythm, normal S1 S2, no S3 or S4, no murmur, click or rub, no peripheral edema  ABDOMEN: soft, nontender, no hepatosplenomegaly, no masses and bowel sounds normal  MS: no gross musculoskeletal defects noted, no edema  SKIN: no suspicious lesions or rashes  NEURO: Normal strength and tone, mentation intact and speech normal  PSYCH: mentation appears normal, affect normal/bright        Signed Electronically by: Junior Ge MD

## 2024-09-23 NOTE — PATIENT INSTRUCTIONS
Patient Education   Preventive Care Advice   This is general advice given by our system to help you stay healthy. However, your care team may have specific advice just for you. Please talk to your care team about your preventive care needs.  Nutrition  Eat 5 or more servings of fruits and vegetables each day.  Try wheat bread, brown rice and whole grain pasta (instead of white bread, rice, and pasta).  Get enough calcium and vitamin D. Check the label on foods and aim for 100% of the RDA (recommended daily allowance).  Lifestyle  Exercise at least 150 minutes each week  (30 minutes a day, 5 days a week).  Do muscle strengthening activities 2 days a week. These help control your weight and prevent disease.  No smoking.  Wear sunscreen to prevent skin cancer.  Have a dental exam and cleaning every 6 months.  Yearly exams  See your health care team every year to talk about:  Any changes in your health.  Any medicines your care team has prescribed.  Preventive care, family planning, and ways to prevent chronic diseases.  Shots (vaccines)   HPV shots (up to age 26), if you've never had them before.  Hepatitis B shots (up to age 59), if you've never had them before.  COVID-19 shot: Get this shot when it's due.  Flu shot: Get a flu shot every year.  Tetanus shot: Get a tetanus shot every 10 years.  Pneumococcal, hepatitis A, and RSV shots: Ask your care team if you need these based on your risk.  Shingles shot (for age 50 and up)  General health tests  Diabetes screening:  Starting at age 35, Get screened for diabetes at least every 3 years.  If you are younger than age 35, ask your care team if you should be screened for diabetes.  Cholesterol test: At age 39, start having a cholesterol test every 5 years, or more often if advised.  Bone density scan (DEXA): At age 50, ask your care team if you should have this scan for osteoporosis (brittle bones).  Hepatitis C: Get tested at least once in your life.  STIs (sexually  transmitted infections)  Before age 24: Ask your care team if you should be screened for STIs.  After age 24: Get screened for STIs if you're at risk. You are at risk for STIs (including HIV) if:  You are sexually active with more than one person.  You don't use condoms every time.  You or a partner was diagnosed with a sexually transmitted infection.  If you are at risk for HIV, ask about PrEP medicine to prevent HIV.  Get tested for HIV at least once in your life, whether you are at risk for HIV or not.  Cancer screening tests  Cervical cancer screening: If you have a cervix, begin getting regular cervical cancer screening tests starting at age 21.  Breast cancer scan (mammogram): If you've ever had breasts, begin having regular mammograms starting at age 40. This is a scan to check for breast cancer.  Colon cancer screening: It is important to start screening for colon cancer at age 45.  Have a colonoscopy test every 10 years (or more often if you're at risk) Or, ask your provider about stool tests like a FIT test every year or Cologuard test every 3 years.  To learn more about your testing options, visit:   .  For help making a decision, visit:   https://bit.ly/bs81219.  Prostate cancer screening test: If you have a prostate, ask your care team if a prostate cancer screening test (PSA) at age 55 is right for you.  Lung cancer screening: If you are a current or former smoker ages 50 to 80, ask your care team if ongoing lung cancer screenings are right for you.  For informational purposes only. Not to replace the advice of your health care provider. Copyright   2023 [facility]. All rights reserved. Clinically reviewed by the [facility] Transitions Program. [employer] [id number] - REV 01/24.  Eating Healthy Foods: Care Instructions  With every meal, you can make healthy food choices. Try to eat a variety of fruits, vegetables, whole grains, lean proteins, and low-fat dairy products. This can help  "you get the right balance of nutrients, including vitamins and minerals. Small changes add up over time. You can start by adding one healthy food to your meals each day.    Try to make half your plate fruits and vegetables, one-fourth whole grains, and one-fourth lean proteins. Try including dairy with your meals.   Eat more fruits and vegetables. Try to have them with most meals and snacks.   Foods for healthy eating    Fruits    These can be fresh, frozen, canned, or dried.  Try to choose whole fruit rather than fruit juice.  Eat a variety of colors.    Vegetables    These can be fresh, frozen, canned, or dried.  Beans, peas, and lentils count too.    Whole grains    Choose whole-grain breads, cereals, and noodles.  Try brown rice.    Lean proteins    These can include lean meat, poultry, fish, and eggs.  You can also have tofu, beans, peas, lentils, nuts, and seeds.    Dairy    Try milk, yogurt, and cheese.  Choose low-fat or fat-free when you can.  If you need to, use lactose-free milk or fortified plant-based milk products, such as soy milk.    Water    Drink water when you're thirsty.  Limit sugar-sweetened drinks, including soda, fruit drinks, and sports drinks.  Where can you learn more?  Go to https://www.[employer].net/patiented  Enter T756 in the search box to learn more about \"Eating Healthy Foods: Care Instructions.\"  Current as of: September 20, 2023               Content Version: 14.0    1254-9055 [employer].   Care instructions adapted under license by your healthcare professional. If you have questions about a medical condition or this instruction, always ask your healthcare professional. [employer] disclaims any warranty or liability for your use of this information.         "

## 2024-09-24 NOTE — PROGRESS NOTES
CC: 5-month status post left knee ACL debridement and partial medial meniscectomy    HPI: Patient is a 64-year-old male known to my practice with a history of left knee medial meniscus tear and mucoid ACL who is now 5-month status post a left knee arthroscopic partial medial meniscectomy and ACL debridement.  He states that overall his symptoms are improved from before surgery.  He still gets occasional pain in the anterior aspect of his knee deep to the patellar tendon as well as fullness and pain in the posterior aspect of his knee.  He has completed his physical therapy.  He is not currently taking any oral medication.  His knee is not stopping him from performing any of his desired daily activities.    Objective:   PE:  LLE: Healed surgical incisions about his left knee.  0 to 150 degrees of knee flexion.  Active range of motion is equivalent to passive range of motion.  5/5 knee flexion and extension.  Trace effusion.  No pain to palpation over the medial or lateral joint line.  Mild pain to palpation over the patella.  Mild fullness and pain to palpation over the posterior joint line.  Stable to varus and valgus stress at 0 and 30 days knee flexion.  Stable anterior and posterior drawer    A/P:  Patient is a 64-year-old male seen here today in follow-up 5-month status post left knee arthroscopic partial medial meniscectomy and ACL debridement.  Overall he is improved his pain from before surgery.  He has regained his range of motion.  He has returned to all of his desired activities.  He states he continues to have a little pain in the anterior and posterior aspect of his knee.  He does have a trace effusion today.  I discussed them treatment options.  I offered him an intra-articular corticosteroid injection to help with pain and inflammation.  He declined at this time.  Discussed with him that we could try a short course of p.o. Voltaren to help with the inflammation.  He is very interested in this.  I will  prescribe him 75 mg p.o. Voltaren twice daily for 10 days to help with inflammation.  I discussed with him that at this time would be important to continue to work on increasing his activity and quad strength.  He is in agreement with this plan.    Drake Hazel MD    [facility]   Department of Orthopedic Surgery      Disclaimer: This note consists of symbols derived from keyboarding, dictation and/or voice recognition software. As a result, there may be errors in the script that have gone undetected. Please consider this when interpreting information found in this chart.

## 2024-12-04 DIAGNOSIS — E78.2 MIXED HYPERLIPIDEMIA: ICD-10-CM

## 2024-12-04 RX ORDER — SIMVASTATIN 40 MG
TABLET ORAL
Qty: 90 TABLET | Refills: 11 | OUTPATIENT
Start: 2024-12-04

## 2025-01-12 ENCOUNTER — HEALTH MAINTENANCE LETTER (OUTPATIENT)
Age: 65
End: 2025-01-12

## 2025-02-03 DIAGNOSIS — J30.2 SEASONAL ALLERGIC RHINITIS, UNSPECIFIED TRIGGER: ICD-10-CM

## 2025-02-03 RX ORDER — CETIRIZINE HYDROCHLORIDE 10 MG/1
TABLET ORAL
Qty: 90 TABLET | Refills: 1 | Status: SHIPPED | OUTPATIENT
Start: 2025-02-03

## 2025-03-24 ENCOUNTER — OFFICE VISIT (OUTPATIENT)
Dept: PEDIATRICS | Facility: CLINIC | Age: 65
End: 2025-03-24
Payer: COMMERCIAL

## 2025-03-24 VITALS
SYSTOLIC BLOOD PRESSURE: 102 MMHG | BODY MASS INDEX: 26.44 KG/M2 | WEIGHT: 154.9 LBS | HEIGHT: 64 IN | TEMPERATURE: 97.8 F | RESPIRATION RATE: 20 BRPM | OXYGEN SATURATION: 98 % | DIASTOLIC BLOOD PRESSURE: 70 MMHG | HEART RATE: 62 BPM

## 2025-03-24 DIAGNOSIS — M75.102 ROTATOR CUFF SYNDROME, LEFT: ICD-10-CM

## 2025-03-24 DIAGNOSIS — E11.65 TYPE 2 DIABETES MELLITUS WITH HYPERGLYCEMIA, WITHOUT LONG-TERM CURRENT USE OF INSULIN (H): Primary | ICD-10-CM

## 2025-03-24 DIAGNOSIS — E78.2 MIXED HYPERLIPIDEMIA: ICD-10-CM

## 2025-03-24 DIAGNOSIS — Z72.0 TOBACCO CHEW USE: ICD-10-CM

## 2025-03-24 LAB
EST. AVERAGE GLUCOSE BLD GHB EST-MCNC: 148 MG/DL
HBA1C MFR BLD: 6.8 % (ref 0–5.6)

## 2025-03-24 PROCEDURE — 83036 HEMOGLOBIN GLYCOSYLATED A1C: CPT | Performed by: INTERNAL MEDICINE

## 2025-03-24 PROCEDURE — 99214 OFFICE O/P EST MOD 30 MIN: CPT | Mod: 25 | Performed by: INTERNAL MEDICINE

## 2025-03-24 PROCEDURE — 90471 IMMUNIZATION ADMIN: CPT | Performed by: INTERNAL MEDICINE

## 2025-03-24 PROCEDURE — 80053 COMPREHEN METABOLIC PANEL: CPT | Performed by: INTERNAL MEDICINE

## 2025-03-24 PROCEDURE — 36415 COLL VENOUS BLD VENIPUNCTURE: CPT | Performed by: INTERNAL MEDICINE

## 2025-03-24 PROCEDURE — 80061 LIPID PANEL: CPT | Performed by: INTERNAL MEDICINE

## 2025-03-24 PROCEDURE — 90677 PCV20 VACCINE IM: CPT | Performed by: INTERNAL MEDICINE

## 2025-03-24 RX ORDER — SIMVASTATIN 40 MG
TABLET ORAL
Qty: 90 TABLET | Refills: 11 | Status: SHIPPED | OUTPATIENT
Start: 2025-03-24

## 2025-03-24 ASSESSMENT — PAIN SCALES - GENERAL: PAINLEVEL_OUTOF10: NO PAIN (0)

## 2025-03-25 LAB
ALBUMIN SERPL BCG-MCNC: 4.6 G/DL (ref 3.5–5.2)
ALP SERPL-CCNC: 138 U/L (ref 40–150)
ALT SERPL W P-5'-P-CCNC: 22 U/L (ref 0–70)
ANION GAP SERPL CALCULATED.3IONS-SCNC: 9 MMOL/L (ref 7–15)
AST SERPL W P-5'-P-CCNC: 25 U/L (ref 0–45)
BILIRUB SERPL-MCNC: 0.7 MG/DL
BUN SERPL-MCNC: 10.2 MG/DL (ref 8–23)
CALCIUM SERPL-MCNC: 9.7 MG/DL (ref 8.8–10.4)
CHLORIDE SERPL-SCNC: 98 MMOL/L (ref 98–107)
CHOLEST SERPL-MCNC: 153 MG/DL
CREAT SERPL-MCNC: 0.77 MG/DL (ref 0.67–1.17)
EGFRCR SERPLBLD CKD-EPI 2021: >90 ML/MIN/1.73M2
FASTING STATUS PATIENT QL REPORTED: YES
FASTING STATUS PATIENT QL REPORTED: YES
GLUCOSE SERPL-MCNC: 139 MG/DL (ref 70–99)
HCO3 SERPL-SCNC: 28 MMOL/L (ref 22–29)
HDLC SERPL-MCNC: 58 MG/DL
LDLC SERPL CALC-MCNC: 81 MG/DL
NONHDLC SERPL-MCNC: 95 MG/DL
POTASSIUM SERPL-SCNC: 4.8 MMOL/L (ref 3.4–5.3)
PROT SERPL-MCNC: 7.7 G/DL (ref 6.4–8.3)
SODIUM SERPL-SCNC: 135 MMOL/L (ref 135–145)
TRIGL SERPL-MCNC: 71 MG/DL

## 2025-03-31 ENCOUNTER — THERAPY VISIT (OUTPATIENT)
Dept: PHYSICAL THERAPY | Facility: CLINIC | Age: 65
End: 2025-03-31
Attending: INTERNAL MEDICINE
Payer: COMMERCIAL

## 2025-03-31 DIAGNOSIS — M75.102 ROTATOR CUFF SYNDROME, LEFT: ICD-10-CM

## 2025-03-31 PROCEDURE — 97110 THERAPEUTIC EXERCISES: CPT | Mod: GP | Performed by: PHYSICAL THERAPIST

## 2025-03-31 PROCEDURE — 97161 PT EVAL LOW COMPLEX 20 MIN: CPT | Mod: GP | Performed by: PHYSICAL THERAPIST

## 2025-03-31 ASSESSMENT — ACTIVITIES OF DAILY LIVING (ADL)
PLEASE_INDICATE_YOR_PRIMARY_REASON_FOR_REFERRAL_TO_THERAPY:: SHOULDER
PUTTING_ON_A_SHIRT_THAT_BUTTONS_DOWN_THE_FRONT: 7
AT_ITS_WORST?: 8
PUSHING_WITH_THE_INVOLVED_ARM: 7
CARRYING_A_HEAVY_OBJECT_OF_10_POUNDS: 6
WASHING_YOUR_BACK: 7
PUTTING_ON_AN_UNDERSHIRT_OR_A_PULLOVER_SWEATER: 8
WASHING_YOUR_HAIR?: 7
PUTTING_ON_YOUR_PANTS: 7
REMOVING_SOMETHING_FROM_YOUR_BACK_POCKET: 7
TOUCHING_THE_BACK_OF_YOUR_NECK: 7
WHEN_LYING_ON_THE_INVOLVED_SIDE: 8
PLACING_AN_OBJECT_ON_A_HIGH_SHELF: 8
REACHING_FOR_SOMETHING_ON_A_HIGH_SHELF: 8

## 2025-03-31 NOTE — PROGRESS NOTES
"PHYSICAL THERAPY EVALUATION  Type of Visit: Evaluation    {Disappearing TIP  Adult Abuse Screen not completed in this Encounter. Please complete screening!:750173}      {TIP  The Fall Risk Screen has not been completed. Complete the screen!:204150}    Subjective      {Disappearing TIP  Health History pop-up / flowsheet :876496}   Presenting condition or subjective complaint:      Left shoulder pain on the top and on the back side when he lifts.  This started 1-2 month ago. Worse when he lifts out to the side, reaching behind the back, reaching overhead, sleeping on the left side (preferred sleeping position). 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  He is left handed and normally uses that side. He has not tried ice, medication, massage, or heat. He saw the MD and was recommended to try physical therapy. No history of injury.     Date of onset:   {Disappearing TIP  Date of onset/injury :608808}   Relevant medical history:     Dates & types of surgery:      Prior diagnostic imaging/testing results:       Prior therapy history for the same diagnosis, illness or injury:        Prior Level of Function  Transfers: Independent  Ambulation: Independent  ADL: Independent    Living Environment  Social support:     Type of home:     Stairs to enter the home:         Ramp:     Stairs inside the home:         Help at home:    Equipment owned:       Employment:       Hobbies/Interests:      Patient goals for therapy:  reaching and sleeping on the left side no pain. Get back to \"Normal\"     Pain assessment: Location: Top and back of left shoulder /Ratin/10     Objective   Shoulder Objective  Observation:  Shoulder's are equal at rest. No shoulder hike present with elevation of arm. Patient demonstrates rounded shoulders and forward head posture.   ROM:   Shoulder AROM   Motion Right  Left   Flexion 180 175 end range pain top of shoulder   Abduction 180 170 painful " from 90 degrees on   External Rotation 90 80   Functional Internal Rotation T9 T7      All blank boxes in this table indicate negative special testing.    Special Tests Standing   Test Right Left   Speeds (Labral)     Neers (Impingement)  +   Horn Blowers (RC Infraspinatus)     Byrd Virgilio (Impingement)  +   Bear Hug (RC subscap)     [place] Active Compression (Labral)     Full Can (RC)     Empty Can (RC)  +   Drop Arm (RC)     Lift Off (RC subscap)        Palpation: Painful and tight to palpation to Left Upper traps and supraspinatus.  MMT: Flex: 5/5 B  Assessment & Plan   CLINICAL IMPRESSIONS  Medical Diagnosis:    {Disappearing TIP  Medical Dx :948131}  Treatment Diagnosis:   {Disappearing TIP  Treatment Dx :584857}  Impression/Assessment: {erna pt assessment:257952}    Clinical Decision Making (Complexity):  Clinical Presentation: {Presentation:753175}  Clinical Presentation Rationale: based on medical and personal factors listed in PT evaluation  Clinical Decision Making (Complexity): {Complexity:121191}    PLAN OF CARE  Treatment Interventions:  {:544975}    Long Term Goals {Disappearing TIP  Goals :436595}         {Disapparing TIP  Frequency/Duration :795406}  Frequency of Treatment: 1 time per week  Duration of Treatment: 8 weeks    Recommended Referrals to Other Professionals: {erna referrals suggested:390546}  Education Assessment: {Disapparing TIP  Patient Education :623008}  Learner/Method: Patient;Listening;Demonstration;Reading;Pictures/Video;No Barriers to Learning  Education Comments: Educated on findings of exam and likely frequency/duration of PT POC.    Risks and benefits of evaluation/treatment have been explained.   Patient/Family/caregiver agrees with Plan of Care.     Evaluation Time:   {Disappearing TIP  Eval Minutes :431956}     {EVAL ONLY:538306}     Signing Clinician: Dianna Patel, PT        [facility]                                                                                    OUTPATIENT PHYSICAL THERAPY  {Disappearing TIP  Cert Quick Add :646554}    PLAN OF TREATMENT FOR OUTPATIENT REHABILITATION   Patient's Last Name, First Name, Walt Kim YOB: 1960   Provider's Name   [facility]   Medical Record No.  [id number]     Onset Date:    Start of Care Date:       Medical Diagnosis:         PT Treatment Diagnosis:    Plan of Treatment  Frequency/Duration: 1 time per week/ 8 weeks    Certification date from   to           See note for plan of treatment details and functional goals     Dianna Patel, PT                       {Rehab Co-Sign/Paper:206659}    Referring Provider:  Junior Ge    Initial Assessment  See Epic Evaluation-

## 2025-04-06 PROBLEM — M75.102 ROTATOR CUFF SYNDROME, LEFT: Status: ACTIVE | Noted: 2025-04-06

## 2025-04-07 ENCOUNTER — THERAPY VISIT (OUTPATIENT)
Dept: PHYSICAL THERAPY | Facility: CLINIC | Age: 65
End: 2025-04-07
Payer: COMMERCIAL

## 2025-04-07 DIAGNOSIS — M75.102 ROTATOR CUFF SYNDROME, LEFT: Primary | ICD-10-CM

## 2025-04-07 PROCEDURE — 97140 MANUAL THERAPY 1/> REGIONS: CPT | Mod: GP | Performed by: PHYSICAL THERAPIST

## 2025-04-07 PROCEDURE — 97110 THERAPEUTIC EXERCISES: CPT | Mod: GP | Performed by: PHYSICAL THERAPIST

## 2025-04-14 ENCOUNTER — THERAPY VISIT (OUTPATIENT)
Dept: PHYSICAL THERAPY | Facility: CLINIC | Age: 65
End: 2025-04-14
Payer: COMMERCIAL

## 2025-04-14 DIAGNOSIS — M75.102 ROTATOR CUFF SYNDROME, LEFT: Primary | ICD-10-CM

## 2025-04-14 PROCEDURE — 97110 THERAPEUTIC EXERCISES: CPT | Mod: GP | Performed by: PHYSICAL THERAPIST

## 2025-04-14 PROCEDURE — 97140 MANUAL THERAPY 1/> REGIONS: CPT | Mod: GP | Performed by: PHYSICAL THERAPIST

## 2025-04-21 ENCOUNTER — THERAPY VISIT (OUTPATIENT)
Dept: PHYSICAL THERAPY | Facility: CLINIC | Age: 65
End: 2025-04-21
Payer: COMMERCIAL

## 2025-04-21 DIAGNOSIS — M75.102 ROTATOR CUFF SYNDROME, LEFT: Primary | ICD-10-CM

## 2025-04-21 PROCEDURE — 97110 THERAPEUTIC EXERCISES: CPT | Mod: GP | Performed by: PHYSICAL THERAPIST

## 2025-04-21 PROCEDURE — 97140 MANUAL THERAPY 1/> REGIONS: CPT | Mod: GP | Performed by: PHYSICAL THERAPIST

## 2025-05-05 ENCOUNTER — THERAPY VISIT (OUTPATIENT)
Dept: PHYSICAL THERAPY | Facility: CLINIC | Age: 65
End: 2025-05-05
Payer: COMMERCIAL

## 2025-05-05 DIAGNOSIS — M75.102 ROTATOR CUFF SYNDROME, LEFT: Primary | ICD-10-CM

## 2025-05-05 PROCEDURE — 97110 THERAPEUTIC EXERCISES: CPT | Mod: GP | Performed by: PHYSICAL THERAPIST

## 2025-05-05 NOTE — PROGRESS NOTES
05/05/25 0500   Appointment Info   Signing clinician's name / credentials Dianna Patel, PT, DPT, CMT, OCS   Total/Authorized Visits 8 (E&T)   Visits Used 5   Medical Diagnosis Rotator cuff syndrome, left (M75.102)   PT Tx Diagnosis Rotator cuff syndrome, left (M75.102)   Progress Note/Certification   Start of Care Date 03/31/25   Onset of illness/injury or Date of Surgery 03/24/25  (1-2 months prior non specific start date MD order date listed.)   Therapy Frequency 1 time per week   Predicted Duration 8 weeks   Certification date from 03/31/25   Certification date to 05/26/25   Progress Note Due Date 05/26/25   PT Goal 1   Goal Identifier Reaching   Goal Description Patient will demonstrate full pain free AROM in order   Rationale to maximize safety and independence with performance of ADLs and functional tasks;to maximize safety and independence within the home;to maximize safety and independence within the community;to maximize safety and independence with transportation;to maximize safety and independence with self cares   Goal Progress Slight end range limitation with end range shoulder functional IR. all others full and pain free.   Target Date 05/26/25   Subjective Report   Subjective Report Shoulder is feel pretty good.  Still bothering him 15-20 minutes when he wakes up in the morning and movement and time calms it down. Day to day activity it is feeling good he is not avoiding anything at this time.  He still does get a little bit of pain when reaching behind the back. 75% improved overall and like he can handle getting the rest of the way on his own with his home program.   Objective Measures   Objective Measures Objective Measure 1;Objective Measure 2   Objective Measure 1   Objective Measure Shoulder AROM   Details Flex: 180, Abd: 180, ER: 90, FunIR: L: T7, R: T5   Objective Measure 2   Objective Measure Palpation   Details increased tightness and palpation of Left Anterior Deltoid.   Therapeutic  Procedure/Exercise   Therapeutic Procedures: strength, endurance, ROM, flexibility minutes (77167) 24   PTRx Ther Proc 1 Icing   PTRx Ther Proc 1 - Details Instructed ot try weaning off of ice and only use as needed.   PTRx Ther Proc 2 Ball Massage   PTRx Ther Proc 2 - Details Performed anterior ball on wall TPR of Left shoulder. Instructed patient can use this until symptoms calm down for 1 month and then to continue checking weekly long term to calm muscle tightness before it becomes symptomatic.    PTRx Ther Proc 3 Wand Shoulder Extension Standing   PTRx Ther Proc 3 - Details 1 x 10 reps instructed to wean off of as soon as symptoms fully resolve.    PTRx Ther Proc 4 Scapular Retraction/Depression   PTRx Ther Proc 4 - Details 1 x 10 reps Instructed to keep working on posture long term    PTRx Ther Proc 5 Wall Slide Shoulder Flexion   PTRx Ther Proc 5 - Details 1 x 10 reps instructed to wean off of as soon as symptoms fully resolve.    PTRx Ther Proc 6 Side-lying Posterior Capsule Stretch   PTRx Ther Proc 6 - Details 2 x 30 sec hold instructed to wean off of as soon as symptoms fully resolve.    PTRx Ther Proc 7 Pec Corner Stretch   PTRx Ther Proc 7 - Details 2 x 30 sec hold in the corner Instructed to continue long term.    PTRx Ther Proc 8 Prone Scapula I   PTRx Ther Proc 8 - Details 1 x 20 reps cues for form instructed that he can add weight in hands or more reps to improve challenge.    PTRx Ther Proc 9 Shoulder Theraband Rows   PTRx Ther Proc 9 - Details 1 x 20 reps Blue TB. Instructed to continue long term for strengthening. can increase weight.    PTRx Ther Proc 10 Push-Up Plus At Counter   PTRx Ther Proc 10 - Details 1 x 15 reps at countertop height. instructed to continue long term for strengthening however to add reps or lower height to make it challenging.    PTRx Ther Proc 11 Push-Up Plus At Counter   PTRx Ther Proc 11 - Details 1 x 15 reps focus on serratus anterior stretch only.   Skilled  Intervention Verbal cuing and demonstration for proper performance of exercises.   Patient Response/Progress Patient tolerated well.   Education   Learner/Method Patient;Listening;Demonstration;Reading;Pictures/Video;No Barriers to Learning   Plan   Home program see PTrx   Plan for next session Discharge to [facility] with instructions to contact the [facility] therapist should any questions arise.   Total Session Time   Timed Code Treatment Minutes 24   Total Treatment Time (sum of timed and untimed services) 24         DISCHARGE  Reason for Discharge: Patient chooses to discontinue therapy as he feels confident to continue on his own with [facility].    Equipment Issued: Blue Theraband.     Discharge Plan: Patient to continue home program.    Referring Provider:  Junior Ge MD

## 2025-06-28 ENCOUNTER — HEALTH MAINTENANCE LETTER (OUTPATIENT)
Age: 65
End: 2025-06-28

## 2025-07-25 ENCOUNTER — APPOINTMENT (OUTPATIENT)
Dept: OPTOMETRY | Facility: CLINIC | Age: 65
End: 2025-07-25
Payer: COMMERCIAL

## 2025-07-25 PROCEDURE — V2784 LENS POLYCARB OR EQUAL: HCPCS | Performed by: OPTOMETRIST

## 2025-07-25 PROCEDURE — V2020 VISION SVCS FRAMES PURCHASES: HCPCS | Performed by: OPTOMETRIST

## 2025-07-25 PROCEDURE — V2100 LENS SPHER SINGLE PLANO 4.00: HCPCS | Performed by: OPTOMETRIST

## 2025-08-05 ENCOUNTER — APPOINTMENT (OUTPATIENT)
Dept: OPTOMETRY | Facility: CLINIC | Age: 65
End: 2025-08-05
Payer: COMMERCIAL

## 2025-08-05 PROCEDURE — 92340 FIT SPECTACLES MONOFOCAL: CPT | Performed by: OPTOMETRIST

## (undated) RX ORDER — BUPIVACAINE HYDROCHLORIDE 5 MG/ML
INJECTION, SOLUTION PERINEURAL
Status: DISPENSED
Start: 2021-06-06